# Patient Record
Sex: FEMALE | Race: WHITE | NOT HISPANIC OR LATINO | Employment: UNEMPLOYED | URBAN - METROPOLITAN AREA
[De-identification: names, ages, dates, MRNs, and addresses within clinical notes are randomized per-mention and may not be internally consistent; named-entity substitution may affect disease eponyms.]

---

## 2017-09-22 ENCOUNTER — TRANSCRIBE ORDERS (OUTPATIENT)
Dept: ADMINISTRATIVE | Facility: HOSPITAL | Age: 47
End: 2017-09-22

## 2017-09-22 DIAGNOSIS — Z12.31 VISIT FOR SCREENING MAMMOGRAM: Primary | ICD-10-CM

## 2017-10-19 ENCOUNTER — HOSPITAL ENCOUNTER (OUTPATIENT)
Dept: MAMMOGRAPHY | Facility: HOSPITAL | Age: 47
Discharge: HOME | End: 2017-10-19
Attending: OBSTETRICS & GYNECOLOGY | Admitting: OBSTETRICS & GYNECOLOGY

## 2017-10-19 DIAGNOSIS — Z12.31 VISIT FOR SCREENING MAMMOGRAM: ICD-10-CM

## 2017-10-19 PROCEDURE — 77063 BREAST TOMOSYNTHESIS BI: CPT | Performed by: RADIOLOGY

## 2017-10-19 PROCEDURE — 77063 BREAST TOMOSYNTHESIS BI: CPT

## 2017-10-19 PROCEDURE — 77067 SCR MAMMO BI INCL CAD: CPT | Performed by: RADIOLOGY

## 2017-10-19 PROCEDURE — G0202 SCR MAMMO BI INCL CAD: HCPCS

## 2018-09-24 ENCOUNTER — TRANSCRIBE ORDERS (OUTPATIENT)
Dept: ADMINISTRATIVE | Facility: HOSPITAL | Age: 48
End: 2018-09-24

## 2018-09-24 DIAGNOSIS — Z12.31 VISIT FOR SCREENING MAMMOGRAM: Primary | ICD-10-CM

## 2018-10-16 ENCOUNTER — HOSPITAL ENCOUNTER (OUTPATIENT)
Dept: MAMMOGRAPHY | Facility: HOSPITAL | Age: 48
Discharge: HOME | End: 2018-10-16
Attending: OBSTETRICS & GYNECOLOGY | Admitting: OBSTETRICS & GYNECOLOGY

## 2018-10-16 DIAGNOSIS — Z12.31 VISIT FOR SCREENING MAMMOGRAM: ICD-10-CM

## 2018-10-16 PROCEDURE — 77063 BREAST TOMOSYNTHESIS BI: CPT

## 2018-10-16 PROCEDURE — 77067 SCR MAMMO BI INCL CAD: CPT | Performed by: RADIOLOGY

## 2018-10-16 PROCEDURE — 77063 BREAST TOMOSYNTHESIS BI: CPT | Performed by: RADIOLOGY

## 2018-10-16 PROCEDURE — 77067 SCR MAMMO BI INCL CAD: CPT

## 2019-02-19 ENCOUNTER — OFFICE VISIT (OUTPATIENT)
Dept: BARIATRICS/WEIGHT MGMT | Facility: CLINIC | Age: 49
End: 2019-02-19

## 2019-02-19 ENCOUNTER — LAB (OUTPATIENT)
Dept: LAB | Facility: HOSPITAL | Age: 49
End: 2019-02-19

## 2019-02-19 VITALS
DIASTOLIC BLOOD PRESSURE: 94 MMHG | HEIGHT: 67 IN | SYSTOLIC BLOOD PRESSURE: 136 MMHG | WEIGHT: 287 LBS | BODY MASS INDEX: 45.04 KG/M2

## 2019-02-19 DIAGNOSIS — E66.01 OBESITY, CLASS III, BMI 40-49.9 (MORBID OBESITY) (HCC): ICD-10-CM

## 2019-02-19 DIAGNOSIS — E78.5 HYPERLIPIDEMIA, UNSPECIFIED HYPERLIPIDEMIA TYPE: ICD-10-CM

## 2019-02-19 DIAGNOSIS — I10 ESSENTIAL HYPERTENSION: Primary | ICD-10-CM

## 2019-02-19 PROBLEM — R53.82 CHRONIC FATIGUE: Status: ACTIVE | Noted: 2019-02-19

## 2019-02-19 LAB
25(OH)D3 SERPL-MCNC: 23.9 NG/ML
ALBUMIN SERPL-MCNC: 4.33 G/DL (ref 3.2–4.8)
ALBUMIN/GLOB SERPL: 1.7 G/DL (ref 1.5–2.5)
ALP SERPL-CCNC: 96 U/L (ref 25–100)
ALT SERPL W P-5'-P-CCNC: 24 U/L (ref 7–40)
ANION GAP SERPL CALCULATED.3IONS-SCNC: 6 MMOL/L (ref 3–11)
ARTICHOKE IGE QN: 167 MG/DL (ref 0–130)
AST SERPL-CCNC: 18 U/L (ref 0–33)
BASOPHILS # BLD AUTO: 0.05 10*3/MM3 (ref 0–0.2)
BASOPHILS NFR BLD AUTO: 0.5 % (ref 0–1)
BILIRUB SERPL-MCNC: 0.3 MG/DL (ref 0.3–1.2)
BUN BLD-MCNC: 17 MG/DL (ref 9–23)
BUN/CREAT SERPL: 25.8 (ref 7–25)
CALCIUM SPEC-SCNC: 9.5 MG/DL (ref 8.7–10.4)
CHLORIDE SERPL-SCNC: 105 MMOL/L (ref 99–109)
CHOLEST SERPL-MCNC: 237 MG/DL (ref 0–200)
CO2 SERPL-SCNC: 30 MMOL/L (ref 20–31)
CREAT BLD-MCNC: 0.66 MG/DL (ref 0.6–1.3)
DEPRECATED RDW RBC AUTO: 45.2 FL (ref 37–54)
EOSINOPHIL # BLD AUTO: 0.18 10*3/MM3 (ref 0–0.3)
EOSINOPHIL NFR BLD AUTO: 1.9 % (ref 0–3)
ERYTHROCYTE [DISTWIDTH] IN BLOOD BY AUTOMATED COUNT: 14.1 % (ref 11.3–14.5)
FOLATE SERPL-MCNC: 6.7 NG/ML (ref 3.2–20)
GFR SERPL CREATININE-BSD FRML MDRD: 96 ML/MIN/1.73
GLOBULIN UR ELPH-MCNC: 2.6 GM/DL
GLUCOSE BLD-MCNC: 95 MG/DL (ref 70–100)
HCT VFR BLD AUTO: 40.9 % (ref 34.5–44)
HDLC SERPL-MCNC: 50 MG/DL (ref 40–60)
HGB BLD-MCNC: 12.5 G/DL (ref 11.5–15.5)
IMM GRANULOCYTES # BLD AUTO: 0.03 10*3/MM3 (ref 0–0.05)
IMM GRANULOCYTES NFR BLD AUTO: 0.3 % (ref 0–0.6)
LYMPHOCYTES # BLD AUTO: 1.96 10*3/MM3 (ref 0.6–4.8)
LYMPHOCYTES NFR BLD AUTO: 20.9 % (ref 24–44)
MCH RBC QN AUTO: 27.1 PG (ref 27–31)
MCHC RBC AUTO-ENTMCNC: 30.6 G/DL (ref 32–36)
MCV RBC AUTO: 88.5 FL (ref 80–99)
MONOCYTES # BLD AUTO: 0.56 10*3/MM3 (ref 0–1)
MONOCYTES NFR BLD AUTO: 6 % (ref 0–12)
NEUTROPHILS # BLD AUTO: 6.61 10*3/MM3 (ref 1.5–8.3)
NEUTROPHILS NFR BLD AUTO: 70.7 % (ref 41–71)
PLATELET # BLD AUTO: 297 10*3/MM3 (ref 150–450)
PMV BLD AUTO: 10 FL (ref 6–12)
POTASSIUM BLD-SCNC: 4 MMOL/L (ref 3.5–5.5)
PROT SERPL-MCNC: 6.9 G/DL (ref 5.7–8.2)
RBC # BLD AUTO: 4.62 10*6/MM3 (ref 3.89–5.14)
SODIUM BLD-SCNC: 141 MMOL/L (ref 132–146)
T4 FREE SERPL-MCNC: 1.05 NG/DL (ref 0.89–1.76)
TRIGL SERPL-MCNC: 214 MG/DL (ref 0–150)
TSH SERPL DL<=0.05 MIU/L-ACNC: 1.13 MIU/ML (ref 0.35–5.35)
VIT B12 BLD-MCNC: 821 PG/ML (ref 211–911)
WBC NRBC COR # BLD: 9.36 10*3/MM3 (ref 3.5–10.8)

## 2019-02-19 PROCEDURE — 80053 COMPREHEN METABOLIC PANEL: CPT

## 2019-02-19 PROCEDURE — 83525 ASSAY OF INSULIN: CPT

## 2019-02-19 PROCEDURE — 36415 COLL VENOUS BLD VENIPUNCTURE: CPT

## 2019-02-19 PROCEDURE — 82306 VITAMIN D 25 HYDROXY: CPT

## 2019-02-19 PROCEDURE — 84439 ASSAY OF FREE THYROXINE: CPT

## 2019-02-19 PROCEDURE — MEDWTBODYCOMP: Performed by: NURSE PRACTITIONER

## 2019-02-19 PROCEDURE — 82607 VITAMIN B-12: CPT

## 2019-02-19 PROCEDURE — 84443 ASSAY THYROID STIM HORMONE: CPT

## 2019-02-19 PROCEDURE — 85025 COMPLETE CBC W/AUTO DIFF WBC: CPT

## 2019-02-19 PROCEDURE — MEDWTINJ PR MEDICAL WT LOSS INJECTION: Performed by: NURSE PRACTITIONER

## 2019-02-19 PROCEDURE — 84481 FREE ASSAY (FT-3): CPT

## 2019-02-19 PROCEDURE — 82746 ASSAY OF FOLIC ACID SERUM: CPT

## 2019-02-19 PROCEDURE — 80061 LIPID PANEL: CPT

## 2019-02-19 PROCEDURE — MEDWTESTPT: Performed by: NURSE PRACTITIONER

## 2019-02-19 RX ORDER — CETIRIZINE HYDROCHLORIDE 10 MG/1
1 TABLET ORAL DAILY
Refills: 2 | COMMUNITY
Start: 2019-02-04

## 2019-02-19 RX ORDER — IPRATROPIUM BROMIDE 42 UG/1
SPRAY, METERED NASAL
COMMUNITY
Start: 2019-02-08 | End: 2021-03-25

## 2019-02-19 RX ORDER — LISINOPRIL AND HYDROCHLOROTHIAZIDE 20; 12.5 MG/1; MG/1
1 TABLET ORAL DAILY
Refills: 1 | COMMUNITY
Start: 2019-01-06

## 2019-02-19 RX ORDER — OMEPRAZOLE 40 MG/1
CAPSULE, DELAYED RELEASE ORAL
Refills: 0 | COMMUNITY
Start: 2019-01-16 | End: 2021-03-25

## 2019-02-19 RX ORDER — MAGNESIUM GLUCONATE 30 MG(550)
595 TABLET ORAL DAILY
Qty: 90 TABLET | Refills: 2 | Status: SHIPPED | OUTPATIENT
Start: 2019-02-19 | End: 2019-07-17 | Stop reason: SDUPTHER

## 2019-02-19 RX ORDER — PANTOPRAZOLE SODIUM 40 MG/1
TABLET, DELAYED RELEASE ORAL DAILY
Refills: 2 | COMMUNITY
Start: 2018-11-30 | End: 2019-08-29 | Stop reason: ALTCHOICE

## 2019-02-19 RX ORDER — CHLORAL HYDRATE 500 MG
1000 CAPSULE ORAL 2 TIMES DAILY
Start: 2019-02-19

## 2019-02-19 RX ORDER — CHROMIUM 200 MCG
1 TABLET ORAL 2 TIMES DAILY WITH MEALS
Start: 2019-02-19

## 2019-02-19 RX ORDER — ACETAMINOPHEN AND CODEINE PHOSPHATE 120; 12 MG/5ML; MG/5ML
SOLUTION ORAL DAILY
Refills: 6 | COMMUNITY
Start: 2019-01-28 | End: 2021-03-01

## 2019-02-19 RX ORDER — TOPIRAMATE 25 MG/1
TABLET ORAL
Qty: 60 TABLET | Refills: 2 | Status: SHIPPED | OUTPATIENT
Start: 2019-02-19 | End: 2019-03-19 | Stop reason: SDUPTHER

## 2019-02-19 NOTE — PROGRESS NOTES
"[facility] [place] [facility]   [facility] [address]  [place] [address]    Name: Daisy Tierney  : 1970  Patient Care Team:  Jane Ponce MD as PCP - General (Internal Medicine)    Chief Complaint;:   Chief Complaint   Patient presents with   • Weight Loss        HPI   Daisy Tierney is a 48 y.o. female here to restart the weight loss program after an absence of 2 years.  This is her highest weight.  Patient is unsatisfied with weight loss progress and has concerns about weight loss. There were no unexpected side effects. of previously used medications. The patient is not taking the recommended multivitamin and fish oil. The patient is not using a food journal. The BMI is Body mass index is 44.95 kg/m²..  She tried weight watchers but did not have any results.  Has been battling allergies for the last 6 months, is finally improving.    The patient is exercising with a FITT score of:         Frequency Intensity Time Strength Training   [x]   0 [x]   0 [x]   0 [x]   0   []   1 (1-2x/week) []   1 (light) []   1 (<10 min) []   1 (1x/week)   []   2 (3-5x/week) []   2 (moderate) []   2 (10-20 min) []   2 (2x/week)   []   3 (fortune)   []   3 (moderately hard)  []   4 (very hard) []   3 (20-30 min)  []   4 (>30 min) []   3 (3-4x/week)             VS   Vitals:    19 1012   BP: 136/94   BP Location: Left arm   Patient Position: Sitting   Cuff Size: Large Adult   Weight: 130 kg (287 lb)   Height: 170.2 cm (67\")       Change in weight since last visit: 38lbs gained    Body composition analysis completed and showed:  %body fat: 47.8%  Total fat mass: 137lbs  Lean body mass: 150lbs    Measurements (in inches)  Neck: 16  Chest: 45.5  Waist: 46  Hips: 54  Thighs: 49    History    Past Medical History:   Diagnosis Date   • Allergic rhinitis    • GERD (gastroesophageal reflux disease)    • Hypertension        Patient Active Problem List   Diagnosis   • Chronic fatigue "       Allergies   Allergen Reactions   • Penicillins Itching         Current Outpatient Medications:   •  cetirizine (zyrTEC) 10 MG tablet, Take 1 tablet by mouth Daily., Disp: , Rfl: 2  •  ipratropium (ATROVENT) 0.06 % nasal spray, , Disp: , Rfl:   •  lisinopril-hydrochlorothiazide (PRINZIDE,ZESTORETIC) 20-12.5 MG per tablet, Take 1 tablet by mouth Daily., Disp: , Rfl: 1  •  norethindrone (MICRONOR) 0.35 MG tablet, Take  by mouth Daily., Disp: , Rfl: 6  •  omeprazole (priLOSEC) 40 MG capsule, TK 1 C PO D, Disp: , Rfl: 0  •  Chromium (CHROMIUM GTF) 200 MCG tablet, Take 1 tablet by mouth 2 (Two) Times a Day With Meals., Disp: , Rfl:   •  Multiple Vitamins-Minerals (MULTIVITAMIN ADULTS) tablet, Take 1 tablet by mouth Daily., Disp: , Rfl:   •  Omega-3 1000 MG capsule, Take 1,000 mg by mouth 2 (Two) Times a Day., Disp: , Rfl:   •  pantoprazole (PROTONIX) 40 MG EC tablet, Take  by mouth Daily., Disp: , Rfl: 2  •  potassium gluconate 595 (99 K) MG tablet tablet, Take 1 tablet by mouth Daily., Disp: 90 tablet, Rfl: 2  •  topiramate (TOPAMAX) 25 MG tablet, Take one daily at bedtime for a week then increase to 2 daily at bedtime, Disp: 60 tablet, Rfl: 2    Physical Exam:    General:  well developed; well nourished  no acute distress  obese    Lungs:  breathing is unlabored  clear to auscultation bilaterally   Heart:  regular rate and rhythm, S1, S2 normal, no murmur, click, rub or gallop       ASSESSMENT/PLAN:   Daisy was seen today for weight loss.    Diagnoses and all orders for this visit:    Essential hypertension    Obesity, Class III, BMI 40-49.9 (morbid obesity) (CMS/[facility])  -     topiramate (TOPAMAX) 25 MG tablet; Take one daily at bedtime for a week then increase to 2 daily at bedtime  -     Omega-3 1000 MG capsule; Take 1,000 mg by mouth 2 (Two) Times a Day.  -     Multiple Vitamins-Minerals (MULTIVITAMIN ADULTS) tablet; Take 1 tablet by mouth Daily.  -     Chromium (CHROMIUM GTF) 200 MCG tablet; Take 1 tablet by  mouth 2 (Two) Times a Day With Meals.  -     potassium gluconate 595 (99 K) MG tablet tablet; Take 1 tablet by mouth Daily.  -     CBC & Differential; Future  -     Comprehensive Metabolic Panel; Future  -     T3, Free; Future  -     Insulin, Total; Future  -     Lipid Panel; Future  -     TSH; Future  -     T4, free; Future  -     Vitamin D 25 Hydroxy; Future  -     Vitamin B12; Future  -     Folate; Future    Hyperlipidemia, unspecified hyperlipidemia type    BP elevated today, admits to being a little anxious. Will hold phentermine (tolerated well with program 2 years ago) until next visit and start with topamax. Monitor BP at home, should improve with weight loss and exercise. Add back potassium supplement.    Fatigue:  Should improve with weight loss. Will get labs today. Will try b-12 injection, discussed nutritional changes including decrease carbohydrates and increase water.     Restart program and meds. Being back nutritional focus and work on lifestyle behavioral changes. Recommend restarting food journal using new goal card for guidance. she will work towards initial goal of loss of  >10% of beginning body weight     I have instructed patient to restart the pursuit of medical weight loss as a part of this program. Patient does meet criteria for use of anorectics at this time as BMI >27, body fat percentage >30%, hyperlipidemia and hypertension. However, blood pressure is not acceptable to start today. Consider NV. The current plan for this month includes: patient will food journal, walk and hit their water goal at least 2 days per week. Meal replacement shake for lunch. Discussed calorie amnesia cure.      Plan of care reviewed with patient at the conclusion of today's visit. We discussed the risks, benefits, and limitations of treatments. Patient verbalizes understanding of and agreement with management plan.     Freddie report was pulled on patient, reviewed and found to be appropriate.       Return in  about 1 month (around 3/19/2019) for Recheck.      TANK Flowers

## 2019-02-20 LAB
INSULIN SERPL-ACNC: 24.9 UIU/ML (ref 2.6–24.9)
T3FREE SERPL-MCNC: 3.4 PG/ML (ref 2–4.4)

## 2019-02-21 ENCOUNTER — TELEPHONE (OUTPATIENT)
Dept: BARIATRICS/WEIGHT MGMT | Facility: CLINIC | Age: 49
End: 2019-02-21

## 2019-02-21 DIAGNOSIS — E55.9 VITAMIN D INSUFFICIENCY: ICD-10-CM

## 2019-02-21 DIAGNOSIS — E78.2 MIXED HYPERLIPIDEMIA: ICD-10-CM

## 2019-02-21 DIAGNOSIS — E88.81 INSULIN RESISTANCE: ICD-10-CM

## 2019-02-21 DIAGNOSIS — E66.01 OBESITY, CLASS III, BMI 40-49.9 (MORBID OBESITY) (HCC): ICD-10-CM

## 2019-02-21 DIAGNOSIS — E55.9 VITAMIN D INSUFFICIENCY: Primary | ICD-10-CM

## 2019-02-21 RX ORDER — DEXMETHYLPHENIDATE HYDROCHLORIDE 5 MG/1
5 TABLET ORAL 2 TIMES DAILY
COMMUNITY
End: 2019-08-29

## 2019-02-21 RX ORDER — ERGOCALCIFEROL 1.25 MG/1
50000 CAPSULE ORAL WEEKLY
Qty: 8 CAPSULE | Refills: 0 | Status: SHIPPED | OUTPATIENT
Start: 2019-02-21 | End: 2019-04-23

## 2019-02-21 NOTE — TELEPHONE ENCOUNTER
Called and talked with the pt. Pt verbalized understanding, and stated she would try to  the Vitamin D today.

## 2019-02-21 NOTE — TELEPHONE ENCOUNTER
----- Message from TANK Briseno sent at 2/21/2019  8:19 AM EST -----  Labs reviewed.  1.) Vit D kassieff: start protocol, will send order to pharmacy. Please call and let patient know.   2.) insulin and glucose showing evidence of insulin resistance. Previously on metformin. Will discuss more at next visit.  3.)  Mixed hyperlipidemia; triglyceride/HDL ratio is greater than 3.  Low-cholesterol protein choices and weight loss should help.  4.)  B12 and folate normal and ideal.  5.)  TSH and free T3 are normal and ideal.  Other labs are unremarkable. CG

## 2019-03-19 ENCOUNTER — OFFICE VISIT (OUTPATIENT)
Dept: BARIATRICS/WEIGHT MGMT | Facility: CLINIC | Age: 49
End: 2019-03-19

## 2019-03-19 VITALS
HEIGHT: 67 IN | WEIGHT: 282 LBS | DIASTOLIC BLOOD PRESSURE: 82 MMHG | SYSTOLIC BLOOD PRESSURE: 140 MMHG | HEART RATE: 88 BPM | BODY MASS INDEX: 44.26 KG/M2

## 2019-03-19 DIAGNOSIS — E66.01 OBESITY, CLASS III, BMI 40-49.9 (MORBID OBESITY) (HCC): ICD-10-CM

## 2019-03-19 DIAGNOSIS — R60.9 FLUID RETENTION: Primary | ICD-10-CM

## 2019-03-19 DIAGNOSIS — E55.9 VITAMIN D INSUFFICIENCY: ICD-10-CM

## 2019-03-19 DIAGNOSIS — E88.81 DYSMETABOLIC SYNDROME: ICD-10-CM

## 2019-03-19 PROBLEM — E88.810 DYSMETABOLIC SYNDROME: Status: ACTIVE | Noted: 2019-03-19

## 2019-03-19 PROCEDURE — MEDWTESTPT: Performed by: NURSE PRACTITIONER

## 2019-03-19 RX ORDER — TOPIRAMATE 25 MG/1
TABLET ORAL
Qty: 60 TABLET | Refills: 2 | Status: SHIPPED | OUTPATIENT
Start: 2019-03-19 | End: 2019-04-23 | Stop reason: SDUPTHER

## 2019-03-19 RX ORDER — METFORMIN HYDROCHLORIDE 500 MG/1
500 TABLET, EXTENDED RELEASE ORAL 2 TIMES DAILY WITH MEALS
Qty: 60 TABLET | Refills: 2 | Status: SHIPPED | OUTPATIENT
Start: 2019-03-19 | End: 2019-04-23 | Stop reason: SDUPTHER

## 2019-03-19 RX ORDER — SPIRONOLACTONE 25 MG/1
25 TABLET ORAL DAILY
Qty: 30 TABLET | Refills: 2 | Status: SHIPPED | OUTPATIENT
Start: 2019-03-19 | End: 2019-04-23 | Stop reason: SDUPTHER

## 2019-03-19 NOTE — PROGRESS NOTES
"[facility] [place] [facility]   [facility] [address]  [place] [address]    Name: Daisy Tierney  : 1970  Patient Care Team:  Jane Ponce MD as PCP - General (Internal Medicine)    Chief Complaint;:   Chief Complaint   Patient presents with   • Follow-up        HPI      Daisy Tierney is a 48 y.o. female here to follow up in active weight loss. Patient is satisfied with weight loss progress and has concerns about. There were no unexpected side effects.. There were no medication changes There were no changes in medical or surgical history The patient is taking the recommended multivitamin and fish oil. Hunger control has improved The patient is exercising. The patient is using a food journal. The BMI is Body mass index is 44.17 kg/m²..  Is feeling much better, has made tremendous improvements in changes.  Eating out less snacking less, overall making better food choices.    The patient is exercising with a FITT score of:    Frequency Intensity Time Strength Training   []   0 []   0 []   0 [x]   0   []   1 [x]   1 []   1 []   1   [x]   2 []   2 [x]   2 []   2   []   3  []   4 []   3  []   4 []   3  []   4 []   3  []   4     Change in weight since last visit: 5lbs lost    Current Weight: 282lbs .  Recent Weight History:    Wt Readings from Last 3 Encounters:   19 128 kg (282 lb)   19 130 kg (287 lb)     Start Weight: 287lbs  Total Loss/%Loss of BBW: 5lb     VS   Vitals:    19 0951   BP: 140/82   BP Location: Left arm   Patient Position: Sitting   Cuff Size: Large Adult   Pulse: 88   Weight: 128 kg (282 lb)   Height: 170.2 cm (67\")       Measurements (in inches)  Neck: 16  Chest: 47  Waist: 45  Hips: 55  Thighs: 49    Treatment Objectives  Weight Loss Goal: 5-10% loss of beginning body weight  Non-Weight Loss Goals: Improved blood pressure: has been addressed, diastolic significantly improved.    Past Medical History:   Diagnosis Date   • Allergic " rhinitis    • GERD (gastroesophageal reflux disease)    • Hypertension        Patient Active Problem List   Diagnosis   • Chronic fatigue   • Fluid retention   • Vitamin D insufficiency   • Dysmetabolic syndrome       Allergies   Allergen Reactions   • Penicillins Itching         Current Outpatient Medications:   •  cetirizine (zyrTEC) 10 MG tablet, Take 1 tablet by mouth Daily., Disp: , Rfl: 2  •  Chromium (CHROMIUM GTF) 200 MCG tablet, Take 1 tablet by mouth 2 (Two) Times a Day With Meals., Disp: , Rfl:   •  dexmethylphenidate (FOCALIN) 5 MG tablet, Take 5 mg by mouth 2 (Two) Times a Day., Disp: , Rfl:   •  ipratropium (ATROVENT) 0.06 % nasal spray, , Disp: , Rfl:   •  lisinopril-hydrochlorothiazide (PRINZIDE,ZESTORETIC) 20-12.5 MG per tablet, Take 1 tablet by mouth Daily., Disp: , Rfl: 1  •  Multiple Vitamins-Minerals (MULTIVITAMIN ADULTS) tablet, Take 1 tablet by mouth Daily., Disp: , Rfl:   •  norethindrone (MICRONOR) 0.35 MG tablet, Take  by mouth Daily., Disp: , Rfl: 6  •  Omega-3 1000 MG capsule, Take 1,000 mg by mouth 2 (Two) Times a Day., Disp: , Rfl:   •  omeprazole (priLOSEC) 40 MG capsule, TK 1 C PO D, Disp: , Rfl: 0  •  pantoprazole (PROTONIX) 40 MG EC tablet, Take  by mouth Daily., Disp: , Rfl: 2  •  potassium gluconate 595 (99 K) MG tablet tablet, Take 1 tablet by mouth Daily., Disp: 90 tablet, Rfl: 2  •  topiramate (TOPAMAX) 25 MG tablet, Take one daily at bedtime for a week then increase to 2 daily at bedtime, Disp: 60 tablet, Rfl: 2  •  vitamin D (ERGOCALCIFEROL) 63396 units capsule capsule, Take 1 capsule by mouth 1 (One) Time Per Week., Disp: 8 capsule, Rfl: 0  •  metFORMIN ER (GLUCOPHAGE-XR) 500 MG 24 hr tablet, Take 1 tablet by mouth 2 (Two) Times a Day With Meals. Take 1 daily with dinner or bedtime for one week, then increase to 2 daily., Disp: 60 tablet, Rfl: 2  •  spironolactone (ALDACTONE) 25 MG tablet, Take 1 tablet by mouth Daily., Disp: 30 tablet, Rfl: 2    Physical Exam:    General:   .well developed; well nourished  no acute distress  obese    Lungs:  breathing is unlabored  clear to auscultation bilaterally   Heart:  regular rate and rhythm, S1, S2 normal, no murmur, click, rub or gallop       ASSESSMENT/PLAN:   Daisy was seen today for follow-up.    Diagnoses and all orders for this visit:    Fluid retention  -     spironolactone (ALDACTONE) 25 MG tablet; Take 1 tablet by mouth Daily.    Obesity, Class III, BMI 40-49.9 (morbid obesity) (CMS/[facility])  -     topiramate (TOPAMAX) 25 MG tablet; Take one daily at bedtime for a week then increase to 2 daily at bedtime    Vitamin D insufficiency    Dysmetabolic syndrome  -     metFORMIN ER (GLUCOPHAGE-XR) 500 MG 24 hr tablet; Take 1 tablet by mouth 2 (Two) Times a Day With Meals. Take 1 daily with dinner or bedtime for one week, then increase to 2 daily.      I have instructed the patient to continue with pursuit of medical weight loss as a part of this program. Continue nutritional focus and work towards new exercise FITT goal of:         Frequency Intensity Time Strength Training   []   0 []   0 []   0 []   0   []   1 [x]   1 []   1 []   1   [x]   2 []   2 [x]   2 [x]   2   []   3   []   3  []   4 []   3  []   4 []   3       The current plan for this month includes: add resistance training, increase water to recommended daily amount, weight loss goal 4-6lbs this month, continue to work on lifestyle behavioral changes and continue nutrition focus. Keep up the excellent focus. Already feeling better, blood pressure improved. Add Spironolactone for fluid retention and increased carbohydrate cravings around menses.    Plan of care reviewed with the patient at the conclusion of today's visit. We discussed the risks, benefits, and limitations of treatments. Continue medications and OTC supplements as discussed. Patient verbalizes understanding of and agreement with management plan.      Return in about 1 month (around 4/19/2019) for Next scheduled follow  up.     Eva Jackson APRN

## 2019-04-23 ENCOUNTER — OFFICE VISIT (OUTPATIENT)
Dept: BARIATRICS/WEIGHT MGMT | Facility: CLINIC | Age: 49
End: 2019-04-23

## 2019-04-23 VITALS
HEART RATE: 84 BPM | BODY MASS INDEX: 44.57 KG/M2 | SYSTOLIC BLOOD PRESSURE: 120 MMHG | DIASTOLIC BLOOD PRESSURE: 70 MMHG | HEIGHT: 67 IN | WEIGHT: 284 LBS

## 2019-04-23 DIAGNOSIS — I10 ESSENTIAL HYPERTENSION: ICD-10-CM

## 2019-04-23 DIAGNOSIS — E66.01 OBESITY, CLASS III, BMI 40-49.9 (MORBID OBESITY) (HCC): Primary | ICD-10-CM

## 2019-04-23 DIAGNOSIS — R60.9 FLUID RETENTION: ICD-10-CM

## 2019-04-23 DIAGNOSIS — E88.81 DYSMETABOLIC SYNDROME: ICD-10-CM

## 2019-04-23 DIAGNOSIS — E55.9 VITAMIN D INSUFFICIENCY: ICD-10-CM

## 2019-04-23 PROCEDURE — MEDWTESTPT: Performed by: NURSE PRACTITIONER

## 2019-04-23 PROCEDURE — MEDWTINJ PR MEDICAL WT LOSS INJECTION: Performed by: NURSE PRACTITIONER

## 2019-04-23 RX ORDER — METFORMIN HYDROCHLORIDE 500 MG/1
500 TABLET, EXTENDED RELEASE ORAL 2 TIMES DAILY WITH MEALS
Qty: 60 TABLET | Refills: 2 | Status: SHIPPED | OUTPATIENT
Start: 2019-04-23 | End: 2019-05-23 | Stop reason: SDUPTHER

## 2019-04-23 RX ORDER — PHENTERMINE HYDROCHLORIDE 37.5 MG/1
TABLET ORAL
Qty: 28 TABLET | Refills: 0 | Status: SHIPPED | OUTPATIENT
Start: 2019-04-23 | End: 2019-05-23 | Stop reason: SDUPTHER

## 2019-04-23 RX ORDER — CYANOCOBALAMIN 1000 UG/ML
1000 INJECTION, SOLUTION INTRAMUSCULAR; SUBCUTANEOUS ONCE
Status: COMPLETED | OUTPATIENT
Start: 2019-04-23 | End: 2019-04-23

## 2019-04-23 RX ORDER — TOPIRAMATE 25 MG/1
TABLET ORAL
Qty: 60 TABLET | Refills: 2 | Status: SHIPPED | OUTPATIENT
Start: 2019-04-23 | End: 2019-05-23 | Stop reason: SDUPTHER

## 2019-04-23 RX ORDER — SPIRONOLACTONE 25 MG/1
25 TABLET ORAL DAILY
Qty: 30 TABLET | Refills: 2 | Status: SHIPPED | OUTPATIENT
Start: 2019-04-23 | End: 2020-02-11 | Stop reason: SDUPTHER

## 2019-04-23 RX ADMIN — CYANOCOBALAMIN 1000 MCG: 1000 INJECTION, SOLUTION INTRAMUSCULAR; SUBCUTANEOUS at 10:04

## 2019-04-23 NOTE — PROGRESS NOTES
"[facility] [place] [facility]   [facility] [address]  [place] [address]    Name: Daisy Tierney  : 1970  Patient Care Team:  Jane Ponce MD as PCP - General (Internal Medicine)    Chief Complaint;:   Chief Complaint   Patient presents with   • Follow-up        HPI      Daisy Tierney is a 48 y.o. female here to follow up in active weight loss. Patient is satisfied with weight loss progress and has no questions or concerns today.. There were no unexpected side effects.. There were no medication changes There were no changes in medical or surgical history The patient is taking the recommended multivitamin and fish oil. Hunger control has remain unchanged The patient is exercising. The patient is using a food journal. The BMI is Body mass index is 44.48 kg/m²..     The patient is exercising with a FITT score of:    Frequency Intensity Time Strength Training   []   0 []   0 []   0 []   0   []   1 [x]   1 []   1 []   1   [x]   2 []   2 [x]   2 [x]   2   []   3  []   4 []   3  []   4 []   3  []   4 []   3  []   4     Change in weight since last visit: 2lb    Current Weight:284lb  Recent Weight History:   Wt Readings from Last 3 Encounters:   19 129 kg (284 lb)   19 128 kg (282 lb)   19 130 kg (287 lb)     Start Weight: 287lb  Total Loss/%Loss of BBW: -1.05%    VS   Vitals:    19 0931   BP: 120/70   BP Location: Left arm   Patient Position: Sitting   Cuff Size: Adult   Pulse: 84   Weight: 129 kg (284 lb)   Height: 170.2 cm (67\")       Measurements (in inches)  Neck: 16.5  Chest: 46.5  Waist: 46.5  Hips: 55.5  Thighs: 50    Treatment Objectives   Weight Loss Goal: 5-10% loss of beginning body weight   Non-Weight Loss Goals: Improved blood glucose: has been addressed. and Improved blood pressure: has been addressed.    Past Medical History:   Diagnosis Date   • Allergic rhinitis    • GERD (gastroesophageal reflux disease)    • Hypertension  "       Patient Active Problem List   Diagnosis   • Chronic fatigue   • Fluid retention   • Vitamin D insufficiency   • Dysmetabolic syndrome       Allergies   Allergen Reactions   • Penicillins Itching         Current Outpatient Medications:   •  cetirizine (zyrTEC) 10 MG tablet, Take 1 tablet by mouth Daily., Disp: , Rfl: 2  •  Chromium (CHROMIUM GTF) 200 MCG tablet, Take 1 tablet by mouth 2 (Two) Times a Day With Meals., Disp: , Rfl:   •  dexmethylphenidate (FOCALIN) 5 MG tablet, Take 5 mg by mouth 2 (Two) Times a Day., Disp: , Rfl:   •  ipratropium (ATROVENT) 0.06 % nasal spray, , Disp: , Rfl:   •  lisinopril-hydrochlorothiazide (PRINZIDE,ZESTORETIC) 20-12.5 MG per tablet, Take 1 tablet by mouth Daily., Disp: , Rfl: 1  •  metFORMIN ER (GLUCOPHAGE-XR) 500 MG 24 hr tablet, Take 1 tablet by mouth 2 (Two) Times a Day With Meals. Take 1 daily with dinner or bedtime for one week, then increase to 2 daily., Disp: 60 tablet, Rfl: 2  •  Multiple Vitamins-Minerals (MULTIVITAMIN ADULTS) tablet, Take 1 tablet by mouth Daily., Disp: , Rfl:   •  norethindrone (MICRONOR) 0.35 MG tablet, Take  by mouth Daily., Disp: , Rfl: 6  •  Omega-3 1000 MG capsule, Take 1,000 mg by mouth 2 (Two) Times a Day., Disp: , Rfl:   •  omeprazole (priLOSEC) 40 MG capsule, TK 1 C PO D, Disp: , Rfl: 0  •  pantoprazole (PROTONIX) 40 MG EC tablet, Take  by mouth Daily., Disp: , Rfl: 2  •  potassium gluconate 595 (99 K) MG tablet tablet, Take 1 tablet by mouth Daily., Disp: 90 tablet, Rfl: 2  •  spironolactone (ALDACTONE) 25 MG tablet, Take 1 tablet by mouth Daily., Disp: 30 tablet, Rfl: 2  •  topiramate (TOPAMAX) 25 MG tablet, Take one daily at bedtime for a week then increase to 2 daily at bedtime, Disp: 60 tablet, Rfl: 2  •  Cholecalciferol (VITAMIN D-3) 5000 units tablet, Take 1 tablet by mouth Daily., Disp: 120 tablet, Rfl: 3  •  phentermine (ADIPEX-P) 37.5 MG tablet, Take 1/2 tablet at 11am for 3 days, If it goes over 150/90 call provider, Disp: 28  tablet, Rfl: 0    Physical Exam:    General:  .well developed; well nourished  no acute distress  obese    Lungs:  breathing is unlabored  clear to auscultation bilaterally   Heart:  regular rate and rhythm, S1, S2 normal, no murmur, click, rub or gallop       ASSESSMENT/PLAN:   Daisy was seen today for follow-up.    Diagnoses and all orders for this visit:    Obesity, Class III, BMI 40-49.9 (morbid obesity) (CMS/[facility])  -     topiramate (TOPAMAX) 25 MG tablet; Take one daily at bedtime for a week then increase to 2 daily at bedtime  -     phentermine (ADIPEX-P) 37.5 MG tablet; Take 1/2 tablet at 11am for 3 days, If it goes over 150/90 call provider    Dysmetabolic syndrome  -     metFORMIN ER (GLUCOPHAGE-XR) 500 MG 24 hr tablet; Take 1 tablet by mouth 2 (Two) Times a Day With Meals. Take 1 daily with dinner or bedtime for one week, then increase to 2 daily.  -     cyanocobalamin injection 1,000 mcg    Fluid retention  -     spironolactone (ALDACTONE) 25 MG tablet; Take 1 tablet by mouth Daily.    Essential hypertension  -     phentermine (ADIPEX-P) 37.5 MG tablet; Take 1/2 tablet at 11am for 3 days, If it goes over 150/90 call provider    Vitamin D insufficiency  -     Cholecalciferol (VITAMIN D-3) 5000 units tablet; Take 1 tablet by mouth Daily.        I have instructed the patient to continue with pursuit of medical weight loss as a part of this program. Continue nutritional focus and work towards new exercise FITT goal of:         Frequency Intensity Time Strength Training   []   0 []   0 []   0 []   0   []   1 [x]   1 []   1 []   1   [x]   2 []   2 [x]   2 [x]   2   []   3   []   3  []   4 []   3  []   4 []   3       The current plan for this month includes: continue current exercise efforts, it is appropriate to continue anorectic medications as prescribed at this time and continue to work on lifestyle behavioral changes. Continue to work on water goal. Will add 37 today due to less weight loss than expected,  has taken previously and tolerated well.      Plan of care reviewed with the patient at the conclusion of today's visit. We discussed the risks, benefits, and limitations of treatments. Continue medications and OTC supplements as discussed. Patient verbalizes understanding of and agreement with management plan.      Return in about 1 month (around 5/23/2019) for Next scheduled follow up.     TANK Flowers

## 2019-05-23 ENCOUNTER — OFFICE VISIT (OUTPATIENT)
Dept: BARIATRICS/WEIGHT MGMT | Facility: CLINIC | Age: 49
End: 2019-05-23

## 2019-05-23 VITALS
DIASTOLIC BLOOD PRESSURE: 88 MMHG | WEIGHT: 272 LBS | SYSTOLIC BLOOD PRESSURE: 124 MMHG | BODY MASS INDEX: 42.6 KG/M2 | HEART RATE: 96 BPM

## 2019-05-23 DIAGNOSIS — I10 ESSENTIAL HYPERTENSION: ICD-10-CM

## 2019-05-23 DIAGNOSIS — E88.81 DYSMETABOLIC SYNDROME: ICD-10-CM

## 2019-05-23 DIAGNOSIS — R53.83 OTHER FATIGUE: Primary | ICD-10-CM

## 2019-05-23 DIAGNOSIS — E66.01 OBESITY, CLASS III, BMI 40-49.9 (MORBID OBESITY) (HCC): ICD-10-CM

## 2019-05-23 PROCEDURE — MEDWTINJ PR MEDICAL WT LOSS INJECTION: Performed by: NURSE PRACTITIONER

## 2019-05-23 PROCEDURE — MEDWTESTPT: Performed by: NURSE PRACTITIONER

## 2019-05-23 RX ORDER — PHENTERMINE HYDROCHLORIDE 37.5 MG/1
TABLET ORAL
Qty: 28 TABLET | Refills: 0 | Status: SHIPPED | OUTPATIENT
Start: 2019-05-23 | End: 2019-07-17 | Stop reason: SDUPTHER

## 2019-05-23 RX ORDER — TOPIRAMATE 25 MG/1
TABLET ORAL
Qty: 60 TABLET | Refills: 2 | Status: SHIPPED | OUTPATIENT
Start: 2019-05-23 | End: 2019-07-17 | Stop reason: SDUPTHER

## 2019-05-23 RX ORDER — CYANOCOBALAMIN 1000 UG/ML
1000 INJECTION, SOLUTION INTRAMUSCULAR; SUBCUTANEOUS ONCE
Status: COMPLETED | OUTPATIENT
Start: 2019-05-23 | End: 2019-05-23

## 2019-05-23 RX ORDER — METFORMIN HYDROCHLORIDE 500 MG/1
500 TABLET, EXTENDED RELEASE ORAL 2 TIMES DAILY WITH MEALS
Qty: 60 TABLET | Refills: 2 | Status: SHIPPED | OUTPATIENT
Start: 2019-05-23 | End: 2019-10-01 | Stop reason: SDUPTHER

## 2019-05-23 RX ADMIN — CYANOCOBALAMIN 1000 MCG: 1000 INJECTION, SOLUTION INTRAMUSCULAR; SUBCUTANEOUS at 08:45

## 2019-05-23 NOTE — PROGRESS NOTES
[facility]   [facility] [address]  [place] [address]    Name: Daisy Tierney  : 1970  Patient Care Team:  Jane Ponce MD as PCP - General (Internal Medicine)    Chief Complaint;: No chief complaint on file.       HPI      Daisy Tierney is a 48 y.o. female here to follow up in active weight loss. Patient is satisfied with weight loss progress and has no questions or concerns today.. There were no unexpected side effects.. There were no medication changes There were no changes in medical or surgical history The patient is taking the recommended multivitamin and fish oil. Hunger control has improved The patient is exercising. The patient is using a food journal. The BMI is Body mass index is 42.6 kg/m².. Reflux better with wt loss.   The patient is exercising with a FITT score of:    Frequency Intensity Time Strength Training   []   0 []   0 []   0 []   0   []   1 []   1 []   1 []   1   []   2 [x]   2 []   2 [x]   2   [x]   3  []   4 []   3  []   4 [x]   3  []   4 []   3  []   4     Change in weight since last visit: 12lb    Current Weight: 272.  Recent Weight History:   Wt Readings from Last 3 Encounters:   19 123 kg (272 lb)   19 129 kg (284 lb)   19 128 kg (282 lb)     Start Weight: 287lb  Total Loss/%Loss of BBW: 15lb/5.23%  VS   Vitals:    19 0830   BP: 124/88   BP Location: Left arm   Cuff Size: Large Adult   Pulse: 96   Weight: 123 kg (272 lb)       Measurements (in inches)  Waist: 46    Treatment Objectives   Weight Loss Goal: 15-20% loss of beginning body weight   Non-Weight Loss Goals: Improved blood pressure: has been addressed., Improved cholesterol: has been addressed. and Improved reflux: has been addressed.    Past Medical History:   Diagnosis Date   • Allergic rhinitis    • GERD (gastroesophageal reflux disease)    • Hypertension        Patient Active Problem List   Diagnosis   • Chronic fatigue   • Fluid  retention   • Vitamin D insufficiency   • Dysmetabolic syndrome       Allergies   Allergen Reactions   • Penicillins Itching         Current Outpatient Medications:   •  cetirizine (zyrTEC) 10 MG tablet, Take 1 tablet by mouth Daily., Disp: , Rfl: 2  •  Cholecalciferol (VITAMIN D-3) 5000 units tablet, Take 1 tablet by mouth Daily., Disp: 120 tablet, Rfl: 3  •  Chromium (CHROMIUM GTF) 200 MCG tablet, Take 1 tablet by mouth 2 (Two) Times a Day With Meals., Disp: , Rfl:   •  dexmethylphenidate (FOCALIN) 5 MG tablet, Take 5 mg by mouth 2 (Two) Times a Day., Disp: , Rfl:   •  ipratropium (ATROVENT) 0.06 % nasal spray, , Disp: , Rfl:   •  lisinopril-hydrochlorothiazide (PRINZIDE,ZESTORETIC) 20-12.5 MG per tablet, Take 1 tablet by mouth Daily., Disp: , Rfl: 1  •  metFORMIN ER (GLUCOPHAGE-XR) 500 MG 24 hr tablet, Take 1 tablet by mouth 2 (Two) Times a Day With Meals. Take 1 daily with dinner or bedtime for one week, then increase to 2 daily., Disp: 60 tablet, Rfl: 2  •  Multiple Vitamins-Minerals (MULTIVITAMIN ADULTS) tablet, Take 1 tablet by mouth Daily., Disp: , Rfl:   •  norethindrone (MICRONOR) 0.35 MG tablet, Take  by mouth Daily., Disp: , Rfl: 6  •  Omega-3 1000 MG capsule, Take 1,000 mg by mouth 2 (Two) Times a Day., Disp: , Rfl:   •  omeprazole (priLOSEC) 40 MG capsule, TK 1 C PO D, Disp: , Rfl: 0  •  pantoprazole (PROTONIX) 40 MG EC tablet, Take  by mouth Daily., Disp: , Rfl: 2  •  phentermine (ADIPEX-P) 37.5 MG tablet, Take 1/2 tablet at 11am for 3 days, If it goes over 150/90 call provider, Disp: 28 tablet, Rfl: 0  •  potassium gluconate 595 (99 K) MG tablet tablet, Take 1 tablet by mouth Daily., Disp: 90 tablet, Rfl: 2  •  spironolactone (ALDACTONE) 25 MG tablet, Take 1 tablet by mouth Daily., Disp: 30 tablet, Rfl: 2  •  topiramate (TOPAMAX) 25 MG tablet, Take one daily at bedtime for a week then increase to 2 daily at bedtime, Disp: 60 tablet, Rfl: 2    Current Facility-Administered Medications:   •   cyanocobalamin injection 1,000 mcg, 1,000 mcg, Intramuscular, Once, ManuelEva, APRN    Physical Exam:    General:  .well developed; well nourished  no acute distress  obese    Lungs:  breathing is unlabored  clear to auscultation bilaterally   Heart:  regular rate and rhythm, S1, S2 normal, no murmur, click, rub or gallop       ASSESSMENT/PLAN:   Diagnoses and all orders for this visit:    Other fatigue  -     cyanocobalamin injection 1,000 mcg    Obesity, Class III, BMI 40-49.9 (morbid obesity) (CMS/[facility])  -     cyanocobalamin injection 1,000 mcg  -     phentermine (ADIPEX-P) 37.5 MG tablet; Take 1/2 tablet at 11am for 3 days, If it goes over 150/90 call provider  -     topiramate (TOPAMAX) 25 MG tablet; Take one daily at bedtime for a week then increase to 2 daily at bedtime    Essential hypertension  -     phentermine (ADIPEX-P) 37.5 MG tablet; Take 1/2 tablet at 11am for 3 days, If it goes over 150/90 call provider    Dysmetabolic syndrome  -     metFORMIN ER (GLUCOPHAGE-XR) 500 MG 24 hr tablet; Take 1 tablet by mouth 2 (Two) Times a Day With Meals. Take 1 daily with dinner or bedtime for one week, then increase to 2 daily.    I have instructed the patient to continue with pursuit of medical weight loss as a part of this program. Continue nutritional focus and work towards new exercise FITT goal of:         Frequency Intensity Time Strength Training   []   0 []   0 []   0 []   0   []   1 []   1 []   1 []   1   [x]   2 [x]   2 []   2 [x]   2   []   3   []   3  []   4 [x]   3  []   4 []   3       The current plan for this month includes: Keep up the excellent focus. Keep up great exercise, water, and FJ.     Plan of care reviewed with the patient at the conclusion of today's visit. We discussed the risks, benefits, and limitations of treatments. Continue medications and OTC supplements as discussed. Patient verbalizes understanding of and agreement with management plan.      Return in about 1 month (around  6/23/2019) for Next scheduled follow up.     Eva Jackson APRN

## 2019-07-17 ENCOUNTER — TELEPHONE (OUTPATIENT)
Dept: BARIATRICS/WEIGHT MGMT | Facility: CLINIC | Age: 49
End: 2019-07-17

## 2019-07-17 ENCOUNTER — OFFICE VISIT (OUTPATIENT)
Dept: BARIATRICS/WEIGHT MGMT | Facility: CLINIC | Age: 49
End: 2019-07-17

## 2019-07-17 VITALS
WEIGHT: 268 LBS | DIASTOLIC BLOOD PRESSURE: 72 MMHG | HEART RATE: 88 BPM | SYSTOLIC BLOOD PRESSURE: 120 MMHG | HEIGHT: 67 IN | BODY MASS INDEX: 42.06 KG/M2

## 2019-07-17 DIAGNOSIS — E66.01 OBESITY, CLASS III, BMI 40-49.9 (MORBID OBESITY) (HCC): ICD-10-CM

## 2019-07-17 DIAGNOSIS — I10 ESSENTIAL HYPERTENSION: ICD-10-CM

## 2019-07-17 PROCEDURE — MEDWTESTPT: Performed by: NURSE PRACTITIONER

## 2019-07-17 RX ORDER — PHENTERMINE HYDROCHLORIDE 37.5 MG/1
TABLET ORAL
Qty: 30 TABLET | Refills: 0 | Status: SHIPPED | OUTPATIENT
Start: 2019-07-17 | End: 2019-08-29 | Stop reason: DRUGHIGH

## 2019-07-17 RX ORDER — MAGNESIUM GLUCONATE 30 MG(550)
595 TABLET ORAL DAILY
Qty: 90 TABLET | Refills: 2 | Status: SHIPPED | OUTPATIENT
Start: 2019-07-17 | End: 2022-06-02

## 2019-07-17 RX ORDER — TOPIRAMATE 25 MG/1
TABLET ORAL
Qty: 60 TABLET | Refills: 2 | Status: SHIPPED | OUTPATIENT
Start: 2019-07-17 | End: 2019-10-01 | Stop reason: SDUPTHER

## 2019-07-17 RX ORDER — PHENTERMINE HYDROCHLORIDE 37.5 MG/1
TABLET ORAL
Qty: 28 TABLET | Refills: 0 | Status: SHIPPED | OUTPATIENT
Start: 2019-07-17 | End: 2019-07-17

## 2019-07-17 NOTE — PROGRESS NOTES
"[facility] [place] [facility]   [facility] [address]  [place] [address]    Name: Daisy Tierney  : 1970  Patient Care Team:  Jane Ponce MD as PCP - General (Internal Medicine)    Chief Complaint;:   Chief Complaint   Patient presents with   • Follow-up        HPI      Daisy Tierney is a 48 y.o. female here to follow up in active weight loss. Patient is satisfied with weight loss progress and has no questions or concerns today.. There were no unexpected side effects.. There were no medication changes There were no changes in medical or surgical history The patient is taking the recommended multivitamin and fish oil. Hunger control has improved The patient is exercising. The patient is using a food journal. The BMI is Body mass index is 41.97 kg/m². Went on two vacations, is back on track with routine.     The patient is exercising with a FITT score of:    Frequency   Intensity Time Strength Training   []   0 None  []   0 None  []   0 None  []   0 None    []   1 (1-2x/week) [x]   1 (light) []   1 (<10 min) []   1 (1x/week)   [x]   2 (3-5x/week) []   2 (moderate) []   2 (10-20 min) [x]   2 (2x/week)   []   3 (daily)   []   3 (moderately hard)  []   4 (very hard) [x]   3 (20-30 min)  []   4 (>30 min) []   3 (3-4x/week)       Change in weight since last visit: 4lbs lost    Current Weight: 268lbs .  Recent Weight History:  Wt Readings from Last 3 Encounters:   19 122 kg (268 lb)   19 123 kg (272 lb)   19 129 kg (284 lb)     Start Weight: 287lbs  Total Loss/%Loss of BBW: -6.62%    VS   Vitals:    19 1027   BP: 120/72   BP Location: Left arm   Patient Position: Sitting   Cuff Size: Large Adult   Pulse: 88   Weight: 122 kg (268 lb)   Height: 170.2 cm (67\")       Measurements (in inches)  Neck: 15.5  Chest: 44.5  Waist: 44  Hips: 51.5  Thighs: 47    Treatment Objectives   Weight Loss Goal: 5-10% loss of beginning body weight   Non-Weight Loss " Goals: Improved blood glucose: has been addressed., Improved blood pressure: has been addressed. and Improved cholesterol: has been addressed.    Past Medical History:   Diagnosis Date   • Allergic rhinitis    • GERD (gastroesophageal reflux disease)    • Hypertension        Patient Active Problem List   Diagnosis   • Chronic fatigue   • Fluid retention   • Vitamin D insufficiency   • Dysmetabolic syndrome       Allergies   Allergen Reactions   • Penicillins Itching         Current Outpatient Medications:   •  cetirizine (zyrTEC) 10 MG tablet, Take 1 tablet by mouth Daily., Disp: , Rfl: 2  •  Cholecalciferol (VITAMIN D-3) 5000 units tablet, Take 1 tablet by mouth Daily., Disp: 120 tablet, Rfl: 3  •  Chromium (CHROMIUM GTF) 200 MCG tablet, Take 1 tablet by mouth 2 (Two) Times a Day With Meals., Disp: , Rfl:   •  dexmethylphenidate (FOCALIN) 5 MG tablet, Take 5 mg by mouth 2 (Two) Times a Day., Disp: , Rfl:   •  ipratropium (ATROVENT) 0.06 % nasal spray, , Disp: , Rfl:   •  lisinopril-hydrochlorothiazide (PRINZIDE,ZESTORETIC) 20-12.5 MG per tablet, Take 1 tablet by mouth Daily., Disp: , Rfl: 1  •  metFORMIN ER (GLUCOPHAGE-XR) 500 MG 24 hr tablet, Take 1 tablet by mouth 2 (Two) Times a Day With Meals. Take 1 daily with dinner or bedtime for one week, then increase to 2 daily., Disp: 60 tablet, Rfl: 2  •  Multiple Vitamins-Minerals (MULTIVITAMIN ADULTS) tablet, Take 1 tablet by mouth Daily., Disp: , Rfl:   •  norethindrone (MICRONOR) 0.35 MG tablet, Take  by mouth Daily., Disp: , Rfl: 6  •  Omega-3 1000 MG capsule, Take 1,000 mg by mouth 2 (Two) Times a Day., Disp: , Rfl:   •  omeprazole (priLOSEC) 40 MG capsule, TK 1 C PO D, Disp: , Rfl: 0  •  pantoprazole (PROTONIX) 40 MG EC tablet, Take  by mouth Daily., Disp: , Rfl: 2  •  phentermine (ADIPEX-P) 37.5 MG tablet, Take 1/2 tablet at 11am for 3 days, If it goes over 150/90 call provider, Disp: 28 tablet, Rfl: 0  •  potassium gluconate 595 (99 K) MG tablet tablet, Take 1  tablet by mouth Daily., Disp: 90 tablet, Rfl: 2  •  spironolactone (ALDACTONE) 25 MG tablet, Take 1 tablet by mouth Daily., Disp: 30 tablet, Rfl: 2  •  topiramate (TOPAMAX) 25 MG tablet, Take one daily at bedtime for a week then increase to 2 daily at bedtime, Disp: 60 tablet, Rfl: 2    Physical Exam:    General:  .well developed; well nourished  no acute distress  obese    Lungs:  breathing is unlabored  clear to auscultation bilaterally   Heart:  regular rate and rhythm, S1, S2 normal, no murmur, click, rub or gallop       ASSESSMENT/PLAN:   Daisy was seen today for follow-up.    Diagnoses and all orders for this visit:    Obesity, Class III, BMI 40-49.9 (morbid obesity) (CMS/[facility])    Essential hypertension    I have instructed the patient to continue with pursuit of medical weight loss as a part of this program. Continue nutritional focus and work towards new exercise FITT goal of:     Frequency   Intensity Time Strength Training   []   0 None  []   0 None  []   0 None  []   0 None    []   1 (1-2x/week) []   1 (light) []   1 (<10 min) []   1 (1x/week)   [x]   2 (3-5x/week) [x]   2 (moderate) []   2 (10-20 min) []   2 (2x/week)   []   3 (daily)   []   3 (moderately hard)  []   4 (very hard) [x]   3 (20-30 min)  []   4 (>30 min) [x]   3 (3-4x/week)       The current plan for this month includes: Keep up the excellent daily exercise, continue with water goal (100oz/day), preplanning with meal planning.     Plan of care reviewed with the patient at the conclusion of today's visit. We discussed the risks, benefits, and limitations of treatments. Continue medications and OTC supplements as discussed. Patient verbalizes understanding of and agreement with management plan.      Return in about 1 month (around 8/17/2019) for Next scheduled follow up.     TANK Flowers

## 2019-08-29 ENCOUNTER — OFFICE VISIT (OUTPATIENT)
Dept: BARIATRICS/WEIGHT MGMT | Facility: CLINIC | Age: 49
End: 2019-08-29

## 2019-08-29 VITALS
SYSTOLIC BLOOD PRESSURE: 130 MMHG | HEART RATE: 56 BPM | WEIGHT: 269 LBS | BODY MASS INDEX: 42.22 KG/M2 | DIASTOLIC BLOOD PRESSURE: 80 MMHG | HEIGHT: 67 IN

## 2019-08-29 DIAGNOSIS — E88.81 DYSMETABOLIC SYNDROME: ICD-10-CM

## 2019-08-29 DIAGNOSIS — E66.01 OBESITY, CLASS III, BMI 40-49.9 (MORBID OBESITY) (HCC): ICD-10-CM

## 2019-08-29 DIAGNOSIS — R53.83 OTHER FATIGUE: Primary | ICD-10-CM

## 2019-08-29 PROCEDURE — MEDWTESTPT: Performed by: NURSE PRACTITIONER

## 2019-08-29 RX ORDER — PHENDIMETRAZINE TARTRATE 35 MG/1
TABLET ORAL
Qty: 28 EACH | Refills: 0 | Status: SHIPPED | OUTPATIENT
Start: 2019-08-29 | End: 2019-10-01 | Stop reason: SDUPTHER

## 2019-08-29 RX ORDER — PHENTERMINE HYDROCHLORIDE 37.5 MG/1
TABLET ORAL
Qty: 30 TABLET | Refills: 0 | Status: SHIPPED | OUTPATIENT
Start: 2019-08-29 | End: 2019-10-01 | Stop reason: SDUPTHER

## 2019-08-29 RX ORDER — CYANOCOBALAMIN 1000 UG/ML
1000 INJECTION, SOLUTION INTRAMUSCULAR; SUBCUTANEOUS ONCE
Status: DISCONTINUED | OUTPATIENT
Start: 2019-08-29 | End: 2019-08-29

## 2019-08-29 RX ORDER — CYANOCOBALAMIN 1000 UG/ML
1000 INJECTION, SOLUTION INTRAMUSCULAR; SUBCUTANEOUS ONCE
Status: COMPLETED | OUTPATIENT
Start: 2019-08-29 | End: 2019-08-29

## 2019-08-29 RX ADMIN — CYANOCOBALAMIN 1000 MCG: 1000 INJECTION, SOLUTION INTRAMUSCULAR; SUBCUTANEOUS at 13:02

## 2019-08-29 NOTE — PROGRESS NOTES
"[facility] [place] [facility]   [facility] [address]  [place] [address]    Name: Daisy Tierney  : 1970  Patient Care Team:  Jane Ponce MD as PCP - General (Internal Medicine)    Chief Complaint;:   Chief Complaint   Patient presents with   • Weight Loss   • Nutrition Counseling        HPI      Daisy Tierney is a 48 y.o. female here to follow up in active weight loss. Patient is unsatisfied with weight loss progress and has concerns about hitting a plateau, and not losing more weight . There were no unexpected side effects.. There were no medication changes There were no changes in medical or surgical history The patient is taking the recommended multivitamin and fish oil. Hunger control has worsened  The patient is exercising. The patient is using a food journal. The BMI is Body mass index is 42.13 kg/m²..     The patient is exercising with a FITT score of:    Frequency   Intensity Time Strength Training   []   0 None  []   0 None  []   0 None  []   0 None    [x]   1 (1-2x/week) [x]   1 (light) []   1 (<10 min) [x]   1 (1x/week)   []   2 (3-5x/week) []   2 (moderate) []   2 (10-20 min) []   2 (2x/week)   []   3 (daily)   []   3 (moderately hard)  []   4 (very hard) [x]   3 (20-30 min)  []   4 (>30 min) []   3 (3-4x/week)       Change in weight since last visit: 1lb gained    Current Weight: 269lbs .  Recent Weight History:    Wt Readings from Last 3 Encounters:   19 122 kg (269 lb)   19 122 kg (268 lb)   19 123 kg (272 lb)     Start Weight: 287lbs  Total Loss/%Loss of BBW: -6.27%  VS   Vitals:    19 1250   BP: 130/80   BP Location: Right arm   Patient Position: Sitting   Cuff Size: Large Adult   Pulse: 56   Weight: 122 kg (269 lb)   Height: 170.2 cm (67\")       Measurements (in inches)  Neck: 15  Chest: 45  Waist: 44  Hips: 52  Thighs: 47.5    Treatment Objectives   Weight Loss Goal: 5-10% loss of beginning body weight   Non-Weight Loss " Goals: Improved blood pressure: has been addressed. and Improved reflux: has been addressed.    Past Medical History:   Diagnosis Date   • Allergic rhinitis    • GERD (gastroesophageal reflux disease)    • Hypertension        Patient Active Problem List   Diagnosis   • Chronic fatigue   • Fluid retention   • Vitamin D insufficiency   • Dysmetabolic syndrome       Allergies   Allergen Reactions   • Penicillins Itching         Current Outpatient Medications:   •  cetirizine (zyrTEC) 10 MG tablet, Take 1 tablet by mouth Daily., Disp: , Rfl: 2  •  Cholecalciferol (VITAMIN D-3) 5000 units tablet, Take 1 tablet by mouth Daily., Disp: 120 tablet, Rfl: 3  •  Chromium (CHROMIUM GTF) 200 MCG tablet, Take 1 tablet by mouth 2 (Two) Times a Day With Meals., Disp: , Rfl:   •  ipratropium (ATROVENT) 0.06 % nasal spray, , Disp: , Rfl:   •  lisinopril-hydrochlorothiazide (PRINZIDE,ZESTORETIC) 20-12.5 MG per tablet, Take 1 tablet by mouth Daily., Disp: , Rfl: 1  •  metFORMIN ER (GLUCOPHAGE-XR) 500 MG 24 hr tablet, Take 1 tablet by mouth 2 (Two) Times a Day With Meals. Take 1 daily with dinner or bedtime for one week, then increase to 2 daily., Disp: 60 tablet, Rfl: 2  •  Multiple Vitamins-Minerals (MULTIVITAMIN ADULTS) tablet, Take 1 tablet by mouth Daily., Disp: , Rfl:   •  norethindrone (MICRONOR) 0.35 MG tablet, Take  by mouth Daily., Disp: , Rfl: 6  •  Omega-3 1000 MG capsule, Take 1,000 mg by mouth 2 (Two) Times a Day., Disp: , Rfl:   •  omeprazole (priLOSEC) 40 MG capsule, TK 1 C PO D, Disp: , Rfl: 0  •  potassium gluconate 595 (99 K) MG tablet tablet, Take 1 tablet by mouth Daily., Disp: 90 tablet, Rfl: 2  •  spironolactone (ALDACTONE) 25 MG tablet, Take 1 tablet by mouth Daily., Disp: 30 tablet, Rfl: 2  •  topiramate (TOPAMAX) 25 MG tablet, Take one daily at bedtime for a week then increase to 2 daily at bedtime, Disp: 60 tablet, Rfl: 2  •  Phendimetrazine Tartrate 35 MG tablet, Take 1 tablet at 3 pm., Disp: 28 each, Rfl:  0  •  phentermine (ADIPEX-P) 37.5 MG tablet, Take one tablet by mouth at 11 am., Disp: 30 tablet, Rfl: 0  No current facility-administered medications for this visit.     Physical Exam:    General:  .well developed; well nourished  no acute distress  obese    Lungs:  breathing is unlabored  clear to auscultation bilaterally   Heart:  regular rate and rhythm, S1, S2 normal, no murmur, click, rub or gallop       ASSESSMENT/PLAN:   Daisy was seen today for weight loss and nutrition counseling.    Diagnoses and all orders for this visit:    Other fatigue  -     cyanocobalamin injection 1,000 mcg    Obesity, Class III, BMI 40-49.9 (morbid obesity) (CMS/[facility])  -     phentermine (ADIPEX-P) 37.5 MG tablet; Take one tablet by mouth at 11 am.  -     Phendimetrazine Tartrate 35 MG tablet; Take 1 tablet at 3 pm.    Dysmetabolic syndrome    I have instructed the patient to continue with pursuit of medical weight loss as a part of this program. Continue nutritional focus and work towards new exercise FITT goal of:     Frequency   Intensity Time Strength Training   []   0 None  []   0 None  []   0 None  []   0 None    []   1 (1-2x/week) [x]   1 (light) []   1 (<10 min) [x]   1 (1x/week)   [x]   2 (3-5x/week) []   2 (moderate) []   2 (10-20 min) []   2 (2x/week)   []   3 (daily)   []   3 (moderately hard)  []   4 (very hard) [x]   3 (20-30 min)  []   4 (>30 min) []   3 (3-4x/week)       The current plan for this month includes: Keep up the great new habits of water, FJ with low carb high protein emphasis, ok to add afternoon protein based snack to avoid hunger before dinner. Add 35 in the afternoon. Will increase exercise by 1-2 days this month, will talk with  about new plan this weekend. Labs NV.      Plan of care reviewed with the patient at the conclusion of today's visit. We discussed the risks, benefits, and limitations of treatments. Continue medications and OTC supplements as discussed. Patient verbalizes  understanding of and agreement with management plan.      Return in about 1 month (around 9/29/2019) for Next scheduled follow up.     TANK Flowers

## 2019-09-18 ENCOUNTER — TRANSCRIBE ORDERS (OUTPATIENT)
Dept: ADMINISTRATIVE | Facility: HOSPITAL | Age: 49
End: 2019-09-18

## 2019-09-18 DIAGNOSIS — Z12.31 VISIT FOR SCREENING MAMMOGRAM: Primary | ICD-10-CM

## 2019-10-01 ENCOUNTER — OFFICE VISIT (OUTPATIENT)
Dept: BARIATRICS/WEIGHT MGMT | Facility: CLINIC | Age: 49
End: 2019-10-01

## 2019-10-01 ENCOUNTER — LAB (OUTPATIENT)
Dept: LAB | Facility: HOSPITAL | Age: 49
End: 2019-10-01

## 2019-10-01 VITALS
WEIGHT: 265 LBS | SYSTOLIC BLOOD PRESSURE: 126 MMHG | HEART RATE: 88 BPM | DIASTOLIC BLOOD PRESSURE: 90 MMHG | BODY MASS INDEX: 41.59 KG/M2 | HEIGHT: 67 IN

## 2019-10-01 DIAGNOSIS — E66.01 OBESITY, CLASS III, BMI 40-49.9 (MORBID OBESITY) (HCC): ICD-10-CM

## 2019-10-01 DIAGNOSIS — E55.9 VITAMIN D INSUFFICIENCY: ICD-10-CM

## 2019-10-01 DIAGNOSIS — E88.81 DYSMETABOLIC SYNDROME: ICD-10-CM

## 2019-10-01 DIAGNOSIS — R60.9 FLUID RETENTION: ICD-10-CM

## 2019-10-01 DIAGNOSIS — E55.9 VITAMIN D INSUFFICIENCY: Primary | ICD-10-CM

## 2019-10-01 DIAGNOSIS — R53.83 OTHER FATIGUE: ICD-10-CM

## 2019-10-01 LAB
25(OH)D3 SERPL-MCNC: 35.7 NG/ML (ref 30–100)
ALBUMIN SERPL-MCNC: 4.6 G/DL (ref 3.5–5.2)
ALBUMIN/GLOB SERPL: 1.6 G/DL
ALP SERPL-CCNC: 78 U/L (ref 39–117)
ALT SERPL W P-5'-P-CCNC: 32 U/L (ref 1–33)
ANION GAP SERPL CALCULATED.3IONS-SCNC: 14.3 MMOL/L (ref 5–15)
AST SERPL-CCNC: 26 U/L (ref 1–32)
BILIRUB SERPL-MCNC: 0.3 MG/DL (ref 0.2–1.2)
BUN BLD-MCNC: 13 MG/DL (ref 6–20)
BUN/CREAT SERPL: 15.5 (ref 7–25)
CALCIUM SPEC-SCNC: 9.8 MG/DL (ref 8.6–10.5)
CHLORIDE SERPL-SCNC: 100 MMOL/L (ref 98–107)
CHOLEST SERPL-MCNC: 222 MG/DL (ref 0–200)
CO2 SERPL-SCNC: 24.7 MMOL/L (ref 22–29)
CREAT BLD-MCNC: 0.84 MG/DL (ref 0.57–1)
GFR SERPL CREATININE-BSD FRML MDRD: 73 ML/MIN/1.73
GLOBULIN UR ELPH-MCNC: 2.8 GM/DL
GLUCOSE BLD-MCNC: 117 MG/DL (ref 65–99)
HDLC SERPL-MCNC: 44 MG/DL (ref 40–60)
LDLC SERPL CALC-MCNC: 140 MG/DL (ref 0–100)
LDLC/HDLC SERPL: 3.18 {RATIO}
POTASSIUM BLD-SCNC: 4 MMOL/L (ref 3.5–5.2)
PROT SERPL-MCNC: 7.4 G/DL (ref 6–8.5)
SODIUM BLD-SCNC: 139 MMOL/L (ref 136–145)
TRIGL SERPL-MCNC: 191 MG/DL (ref 0–150)
VLDLC SERPL-MCNC: 38.2 MG/DL (ref 5–40)

## 2019-10-01 PROCEDURE — 36415 COLL VENOUS BLD VENIPUNCTURE: CPT

## 2019-10-01 PROCEDURE — 80061 LIPID PANEL: CPT

## 2019-10-01 PROCEDURE — 80053 COMPREHEN METABOLIC PANEL: CPT

## 2019-10-01 PROCEDURE — MEDWTESTPT: Performed by: NURSE PRACTITIONER

## 2019-10-01 PROCEDURE — 82306 VITAMIN D 25 HYDROXY: CPT

## 2019-10-01 RX ORDER — PHENDIMETRAZINE TARTRATE 35 MG/1
TABLET ORAL
Qty: 28 EACH | Refills: 0 | Status: SHIPPED | OUTPATIENT
Start: 2019-10-01 | End: 2019-12-04 | Stop reason: RX

## 2019-10-01 RX ORDER — TOPIRAMATE 25 MG/1
TABLET ORAL
Qty: 60 TABLET | Refills: 2 | Status: SHIPPED | OUTPATIENT
Start: 2019-10-01 | End: 2020-02-11 | Stop reason: SDUPTHER

## 2019-10-01 RX ORDER — CYANOCOBALAMIN 1000 UG/ML
1000 INJECTION, SOLUTION INTRAMUSCULAR; SUBCUTANEOUS ONCE
Status: DISCONTINUED | OUTPATIENT
Start: 2019-10-01 | End: 2020-02-11

## 2019-10-01 RX ORDER — METFORMIN HYDROCHLORIDE 500 MG/1
500 TABLET, EXTENDED RELEASE ORAL 2 TIMES DAILY WITH MEALS
Qty: 60 TABLET | Refills: 2 | Status: SHIPPED | OUTPATIENT
Start: 2019-10-01 | End: 2019-12-04 | Stop reason: SDUPTHER

## 2019-10-01 RX ORDER — PHENTERMINE HYDROCHLORIDE 37.5 MG/1
TABLET ORAL
Qty: 30 TABLET | Refills: 0 | Status: SHIPPED | OUTPATIENT
Start: 2019-10-01 | End: 2019-12-04 | Stop reason: SDUPTHER

## 2019-10-01 NOTE — PROGRESS NOTES
[facility]   [facility] [address]  [place] [address]    Name: Daisy Tierney  : 1970  Patient Care Team:  Jane Ponce MD as PCP - General (Internal Medicine)    Chief Complaint;:   Chief Complaint   Patient presents with   • Weight Loss   • Nutrition Counseling        HPI      Daisy Tierney is a 48 y.o. female here to follow up in active weight loss. Patient is satisfied with weight loss progress and has no questions or concerns today.. There were no unexpected side effects.. There were no medication changes The changes in medical history were was diagnosed with UTI, now resolved. The patient is taking the recommended multivitamin and fish oil. Hunger control has remain unchanged The patient is exercising. The patient is using a food journal. The BMI is Body mass index is 41.5 kg/m².. New medication was helpful for appetite and plateau. Was able to increase exercise to 4 days/week, keeping up with 80-90oz water/day, daily FJ, weighing once weekly. Fasting for labs today. Highly focused, ready to start feeling better physically (with less knee pain and more energy).       The patient is exercising with a FITT score of:    Frequency   Intensity Time Strength Training   []   0 None  []   0 None  []   0 None  []   0 None    []   1 (1-2x/week) []   1 (light) []   1 (<10 min) []   1 (1x/week)   [x]   2 (3-5x/week) [x]   2 (moderate) []   2 (10-20 min) [x]   2 (2x/week)   []   3 (daily)   []   3 (moderately hard)  []   4 (very hard) []   3 (20-30 min)  [x]   4 (>30 min) []   3 (3-4x/week)       Change in weight since last visit: 4lbs lost    Current Weight: 265lbs .  Recent Weight History:  Wt Readings from Last 3 Encounters:   10/01/19 120 kg (265 lb)   19 122 kg (269 lb)   19 122 kg (268 lb)     Start Weight: 287lbs  Total Loss/%Loss of BBW: -7.67%    VS   Vitals:    10/01/19 0826   BP: 126/90   BP Location: Left arm   Patient Position:  "Sitting   Cuff Size: Large Adult   Pulse: 88   Weight: 120 kg (265 lb)   Height: 170.2 cm (67\")       Measurements (in inches)  Neck: 15  Chest: 44  Waist: 43.5  Hips: 53  Thighs: 46.5    Treatment Objectives   Weight Loss Goal: 5-10% loss of beginning body weight   Non-Weight Loss Goals: Improved blood glucose: has been addressed., Improved cholesterol: has been addressed. and Improved energy: has been addressed.     Past Medical History:   Diagnosis Date   • Allergic rhinitis    • GERD (gastroesophageal reflux disease)    • Hypertension        Patient Active Problem List   Diagnosis   • Chronic fatigue   • Fluid retention   • Vitamin D insufficiency   • Dysmetabolic syndrome       Allergies   Allergen Reactions   • Penicillins Itching         Current Outpatient Medications:   •  cetirizine (zyrTEC) 10 MG tablet, Take 1 tablet by mouth Daily., Disp: , Rfl: 2  •  Cholecalciferol (VITAMIN D-3) 5000 units tablet, Take 1 tablet by mouth Daily., Disp: 120 tablet, Rfl: 3  •  Chromium (CHROMIUM GTF) 200 MCG tablet, Take 1 tablet by mouth 2 (Two) Times a Day With Meals., Disp: , Rfl:   •  ipratropium (ATROVENT) 0.06 % nasal spray, , Disp: , Rfl:   •  lisinopril-hydrochlorothiazide (PRINZIDE,ZESTORETIC) 20-12.5 MG per tablet, Take 1 tablet by mouth Daily., Disp: , Rfl: 1  •  metFORMIN ER (GLUCOPHAGE-XR) 500 MG 24 hr tablet, Take 1 tablet by mouth 2 (Two) Times a Day With Meals. Take 1 daily with dinner or bedtime for one week, then increase to 2 daily., Disp: 60 tablet, Rfl: 2  •  Multiple Vitamins-Minerals (MULTIVITAMIN ADULTS) tablet, Take 1 tablet by mouth Daily., Disp: , Rfl:   •  norethindrone (MICRONOR) 0.35 MG tablet, Take  by mouth Daily., Disp: , Rfl: 6  •  Omega-3 1000 MG capsule, Take 1,000 mg by mouth 2 (Two) Times a Day., Disp: , Rfl:   •  omeprazole (priLOSEC) 40 MG capsule, TK 1 C PO D, Disp: , Rfl: 0  •  Phendimetrazine Tartrate 35 MG tablet, Take 1 tablet at 3 pm., Disp: 28 each, Rfl: 0  •  phentermine " (ADIPEX-P) 37.5 MG tablet, Take one tablet by mouth at 11 am., Disp: 30 tablet, Rfl: 0  •  potassium gluconate 595 (99 K) MG tablet tablet, Take 1 tablet by mouth Daily., Disp: 90 tablet, Rfl: 2  •  spironolactone (ALDACTONE) 25 MG tablet, Take 1 tablet by mouth Daily., Disp: 30 tablet, Rfl: 2  •  topiramate (TOPAMAX) 25 MG tablet, Take one daily at bedtime for a week then increase to 2 daily at bedtime, Disp: 60 tablet, Rfl: 2    Physical Exam:    General:  .well developed; well nourished  no acute distress  obese    Lungs:  breathing is unlabored  clear to auscultation bilaterally   Heart:  regular rate and rhythm, S1, S2 normal, no murmur, click, rub or gallop       ASSESSMENT/PLAN:   Daisy was seen today for weight loss and nutrition counseling.    Diagnoses and all orders for this visit:    Vitamin D insufficiency  -     Vitamin D 25 Hydroxy; Future    Obesity, Class III, BMI 40-49.9 (morbid obesity) (CMS/[facility])  -     Comprehensive Metabolic Panel; Future  -     Lipid Panel; Future  -     phentermine (ADIPEX-P) 37.5 MG tablet; Take one tablet by mouth at 11 am.  -     Phendimetrazine Tartrate 35 MG tablet; Take 1 tablet at 3 pm.  -     topiramate (TOPAMAX) 25 MG tablet; Take one daily at bedtime for a week then increase to 2 daily at bedtime    Fluid retention    Dysmetabolic syndrome  -     Comprehensive Metabolic Panel; Future  -     Lipid Panel; Future  -     metFORMIN ER (GLUCOPHAGE-XR) 500 MG 24 hr tablet; Take 1 tablet by mouth 2 (Two) Times a Day With Meals. Take 1 daily with dinner or bedtime for one week, then increase to 2 daily.    I have instructed the patient to continue with pursuit of medical weight loss as a part of this program. Continue nutritional focus and work towards new exercise FITT goal of:     Frequency   Intensity Time Strength Training   []   0 None  []   0 None  []   0 None  []   0 None    []   1 (1-2x/week) []   1 (light) []   1 (<10 min) []   1 (1x/week)   [x]   2 (3-5x/week) [x]    2 (moderate) []   2 (10-20 min) [x]   2 (2x/week)   []   3 (daily)   []   3 (moderately hard)  []   4 (very hard) []   3 (20-30 min)  [x]   4 (>30 min) []   3 (3-4x/week)       The current plan for this month includes: continue current exercise efforts, weight loss goal 4-6lbs this month and continue nutrition focus, plans to increase duration of exercise by 5-10 minutes. LEXIS. repeat labs today.     Plan of care reviewed with the patient at the conclusion of today's visit. We discussed the risks, benefits, and limitations of treatments. Continue medications and OTC supplements as discussed. Patient verbalizes understanding of and agreement with management plan.      Return in about 1 month (around 11/1/2019) for Next scheduled follow up, Labs this visit.     TANK Flowers

## 2019-11-08 ENCOUNTER — HOSPITAL ENCOUNTER (OUTPATIENT)
Dept: MAMMOGRAPHY | Facility: HOSPITAL | Age: 49
Discharge: HOME | End: 2019-11-08
Admitting: OBSTETRICS & GYNECOLOGY

## 2019-11-08 DIAGNOSIS — Z12.31 VISIT FOR SCREENING MAMMOGRAM: ICD-10-CM

## 2019-11-08 PROCEDURE — 77067 SCR MAMMO BI INCL CAD: CPT

## 2019-11-08 PROCEDURE — 77063 BREAST TOMOSYNTHESIS BI: CPT

## 2019-11-08 PROCEDURE — 77063 BREAST TOMOSYNTHESIS BI: CPT | Performed by: RADIOLOGY

## 2019-11-08 PROCEDURE — 77067 SCR MAMMO BI INCL CAD: CPT | Performed by: RADIOLOGY

## 2019-12-04 ENCOUNTER — OFFICE VISIT (OUTPATIENT)
Dept: BARIATRICS/WEIGHT MGMT | Facility: CLINIC | Age: 49
End: 2019-12-04

## 2019-12-04 VITALS
WEIGHT: 259 LBS | HEIGHT: 67 IN | BODY MASS INDEX: 40.65 KG/M2 | SYSTOLIC BLOOD PRESSURE: 124 MMHG | DIASTOLIC BLOOD PRESSURE: 78 MMHG | HEART RATE: 88 BPM

## 2019-12-04 DIAGNOSIS — E88.81 DYSMETABOLIC SYNDROME: ICD-10-CM

## 2019-12-04 DIAGNOSIS — E66.01 OBESITY, CLASS III, BMI 40-49.9 (MORBID OBESITY) (HCC): ICD-10-CM

## 2019-12-04 DIAGNOSIS — R73.03 PRE-DIABETES: Primary | ICD-10-CM

## 2019-12-04 PROCEDURE — MEDWTESTPT: Performed by: NURSE PRACTITIONER

## 2019-12-04 RX ORDER — METFORMIN HYDROCHLORIDE 500 MG/1
TABLET, EXTENDED RELEASE ORAL
Qty: 90 TABLET | Refills: 2 | Status: SHIPPED | OUTPATIENT
Start: 2019-12-04 | End: 2020-02-11 | Stop reason: SDUPTHER

## 2019-12-04 RX ORDER — PHENTERMINE HYDROCHLORIDE 37.5 MG/1
TABLET ORAL
Qty: 45 TABLET | Refills: 0 | Status: SHIPPED | OUTPATIENT
Start: 2019-12-04 | End: 2020-02-11 | Stop reason: SDUPTHER

## 2019-12-04 NOTE — PROGRESS NOTES
"[facility]   [facility] [address]  [place] [address]    Name: Daisy Tierney  : 1970  Patient Care Team:  Jane Ponce MD as PCP - General (Internal Medicine)    Chief Complaint;:   Chief Complaint   Patient presents with   • Weight Loss   • Nutrition Counseling        HPI   Ms. Daisy Tierney is a 49 y.o. female here to follow up in active weight loss. Patient is satisfied with weight loss progress and has no questions or concerns today.. There were no unexpected side effects. of medications. The patient is taking the recommended multivitamin and is taking fish oil.  The patient is using a food journal.  Body mass index is 40.57 kg/m².   Feels like she's realizing it's not a diet, it's a lifestyle.  The patient is exercising with a FITT score of:    Frequency Intensity Time Strength Training   []   0, none []   0 []   0 []   0   []   1 (1-2x/week) []   1 (light) []   1 (<10 min) []   1 (1x/week)   [x]   2 (3-5x/week) []   2 (moderate) []   2 (10-20 min) [x]   2 (2x/week)   []   3 (daily) [x]   3 (moderately hard)  []   4 (very hard) [x]   3 (20-30 min)  []   4 (>30 min) []   3 (3-4x/week)         VS   Vitals:    19 0933   BP: 124/78   BP Location: Left arm   Patient Position: Sitting   Cuff Size: Large Adult   Pulse: 88   Weight: 117 kg (259 lb)   Height: 170.2 cm (67\")       Weight history (in lbs)  Start Weight: 287lbs  Change in weight since last visit: 6lbs lost  Wt Readings from Last 3 Encounters:   19 117 kg (259 lb)   10/01/19 120 kg (265 lb)   19 122 kg (269 lb)       % of beginning body weight lost: -9.76%    Measurements (in inches)  Neck: 15  Chest: 43.5  Waist: 44  Hips: 52  Thighs: 46    History    Past Medical History:   Diagnosis Date   • Allergic rhinitis    • GERD (gastroesophageal reflux disease)    • Hypertension        Patient Active Problem List   Diagnosis   • Chronic fatigue   • Fluid retention   • " Vitamin D insufficiency   • Dysmetabolic syndrome   • Pre-diabetes       Allergies   Allergen Reactions   • Penicillins Itching         Current Outpatient Medications:   •  cetirizine (zyrTEC) 10 MG tablet, Take 1 tablet by mouth Daily., Disp: , Rfl: 2  •  Cholecalciferol (VITAMIN D-3) 5000 units tablet, Take 1 tablet by mouth Daily., Disp: 120 tablet, Rfl: 3  •  Chromium (CHROMIUM GTF) 200 MCG tablet, Take 1 tablet by mouth 2 (Two) Times a Day With Meals., Disp: , Rfl:   •  ipratropium (ATROVENT) 0.06 % nasal spray, , Disp: , Rfl:   •  lisinopril-hydrochlorothiazide (PRINZIDE,ZESTORETIC) 20-12.5 MG per tablet, Take 1 tablet by mouth Daily., Disp: , Rfl: 1  •  metFORMIN ER (GLUCOPHAGE-XR) 500 MG 24 hr tablet, Take 1 in the am and 2 in the pm, Disp: 90 tablet, Rfl: 2  •  Multiple Vitamins-Minerals (MULTIVITAMIN ADULTS) tablet, Take 1 tablet by mouth Daily., Disp: , Rfl:   •  norethindrone (MICRONOR) 0.35 MG tablet, Take  by mouth Daily., Disp: , Rfl: 6  •  Omega-3 1000 MG capsule, Take 1,000 mg by mouth 2 (Two) Times a Day., Disp: , Rfl:   •  omeprazole (priLOSEC) 40 MG capsule, TK 1 C PO D, Disp: , Rfl: 0  •  phentermine (ADIPEX-P) 37.5 MG tablet, Take one tablet by mouth at 11 am & 1/2 at 3pm, Disp: 45 tablet, Rfl: 0  •  potassium gluconate 595 (99 K) MG tablet tablet, Take 1 tablet by mouth Daily., Disp: 90 tablet, Rfl: 2  •  spironolactone (ALDACTONE) 25 MG tablet, Take 1 tablet by mouth Daily., Disp: 30 tablet, Rfl: 2  •  topiramate (TOPAMAX) 25 MG tablet, Take one daily at bedtime for a week then increase to 2 daily at bedtime, Disp: 60 tablet, Rfl: 2    Current Facility-Administered Medications:   •  cyanocobalamin injection 1,000 mcg, 1,000 mcg, Intramuscular, Once, Eva Jackson APRN    Physical Exam:    General:  .well developed; well nourished  no acute distress  obese    Lungs:  breathing is unlabored  clear to auscultation bilaterally   Heart:  regular rate and rhythm, S1, S2 normal, no murmur,  click, rub or gallop     Labs  Labs reviewed with patient:  Vitamin D is now normal but borderline low.  Will repeat prescription dosing, then go back to daily 5K IU  Blood glucose is still in the prediabetic range at 117.    Cholesterol is improving with weight loss    ASSESSMENT/PLAN:   Daisy was seen today for weight loss and nutrition counseling.    Diagnoses and all orders for this visit:    Pre-diabetes  -Continue with decreasing carbs, increasing resistance training, and weight loss.  Increase metformin to 1500 mg daily  Dysmetabolic syndrome  -     metFORMIN ER (GLUCOPHAGE-XR) 500 MG 24 hr tablet; Take 1 in the am and 2 in the pm    Obesity, Class III, BMI 40-49.9 (morbid obesity) (CMS/[facility])  -     phentermine (ADIPEX-P) 37.5 MG tablet; Take one tablet by mouth at 11 am & 1/2 at 3pm      Continue to work towards initial goal of loss of >10% of beginning body weight   We discussed a medication change due to phendimetrazine being backordered. We will hold phendimetrazine for now and increase phentermine    I have instructed patient to continue with pursuit of medical weight loss as a part of this program. Continue to work on lifestyle behavioral changes.  Patient does meet criteria for use of anorectics at this time as BMI >27.  The goal for this month includes: patient will strength train at least 3 days per week & increase water intake.    Continue nutritional focus and work towards new exercise FITT goal of:         Frequency Intensity Time Strength Training   []   0 []   0 []   0 []   0   []   1 []   1 []   1 []   1   [x]   2 []   2 []   2 [x]   2   []   3   [x]   3  []   4 []   3  [x]   4 []   3       Plan of care reviewed with patient at the conclusion of today's visit. We discussed the risks, benefits, and limitations of treatments. Patient verbalizes understanding of and agreement with management plan.     Return in about 4 weeks (around 1/1/2020).     TANK Blas

## 2020-02-11 ENCOUNTER — OFFICE VISIT (OUTPATIENT)
Dept: BARIATRICS/WEIGHT MGMT | Facility: CLINIC | Age: 50
End: 2020-02-11

## 2020-02-11 VITALS
DIASTOLIC BLOOD PRESSURE: 72 MMHG | SYSTOLIC BLOOD PRESSURE: 120 MMHG | RESPIRATION RATE: 18 BRPM | HEART RATE: 103 BPM | HEIGHT: 67 IN | WEIGHT: 259 LBS | OXYGEN SATURATION: 99 % | BODY MASS INDEX: 40.65 KG/M2 | TEMPERATURE: 96.8 F

## 2020-02-11 DIAGNOSIS — E66.01 OBESITY, CLASS III, BMI 40-49.9 (MORBID OBESITY) (HCC): ICD-10-CM

## 2020-02-11 DIAGNOSIS — R60.9 FLUID RETENTION: ICD-10-CM

## 2020-02-11 DIAGNOSIS — E88.81 DYSMETABOLIC SYNDROME: ICD-10-CM

## 2020-02-11 PROCEDURE — MEDWTESTPT: Performed by: NURSE PRACTITIONER

## 2020-02-11 RX ORDER — SPIRONOLACTONE 25 MG/1
25 TABLET ORAL DAILY
Qty: 30 TABLET | Refills: 2 | Status: SHIPPED | OUTPATIENT
Start: 2020-02-11 | End: 2022-06-02

## 2020-02-11 RX ORDER — PHENTERMINE HYDROCHLORIDE 37.5 MG/1
TABLET ORAL
Qty: 45 TABLET | Refills: 0 | Status: SHIPPED | OUTPATIENT
Start: 2020-02-11

## 2020-02-11 RX ORDER — TOPIRAMATE 25 MG/1
TABLET ORAL
Qty: 60 TABLET | Refills: 2 | Status: SHIPPED | OUTPATIENT
Start: 2020-02-11 | End: 2021-03-25

## 2020-02-11 RX ORDER — METFORMIN HYDROCHLORIDE 500 MG/1
TABLET, EXTENDED RELEASE ORAL
Qty: 90 TABLET | Refills: 2 | Status: SHIPPED | OUTPATIENT
Start: 2020-02-11

## 2020-02-11 RX ORDER — CYANOCOBALAMIN 1000 UG/ML
1000 INJECTION, SOLUTION INTRAMUSCULAR; SUBCUTANEOUS ONCE
Status: DISCONTINUED | OUTPATIENT
Start: 2020-02-11 | End: 2022-06-02

## 2020-02-11 NOTE — PROGRESS NOTES
"[facility]   [facility] [address]  [place] [address]    Name: Daisy Tierney  : 1970  Patient Care Team:  Jane Ponce MD as PCP - General (Internal Medicine)    Chief Complaint;:   Chief Complaint   Patient presents with   • Weight Loss   • Nutrition Counseling        HPI      Daisy Tierney is a 49 y.o. female here to follow up in active weight loss. Patient is satisfied with weight loss progress and has no questions or concerns today.. There were no unexpected side effects.. There were no medication changes There were no changes in medical or surgical history The patient is taking the recommended multivitamin and fish oil. Hunger control has remain unchanged The patient is exercising. The patient is using a food journal. The BMI is Body mass index is 40.57 kg/m²..     The patient is exercising with a FITT score of:    Frequency   Intensity Time Strength Training   []   0 None  []   0 None  []   0 None  []   0 None    []   1 (1-2x/week) []   1 (light) []   1 (<10 min) []   1 (1x/week)   [x]   2 (3-5x/week) []   2 (moderate) []   2 (10-20 min) []   2 (2x/week)   []   3 (daily)   [x]   3 (moderately hard)  []   4 (very hard) []   3 (20-30 min)  [x]   4 (>30 min) [x]   3 (3-4x/week)       Change in weight since last visit: Same    Current Weight: 259lb  Recent Weight History:    Wt Readings from Last 3 Encounters:   20 117 kg (259 lb)   19 117 kg (259 lb)   10/01/19 120 kg (265 lb)     Start Weight: 287lb  Total Loss/%Loss of BBW: -9.76%    VS   Vitals:    20 0907   BP: 120/72   BP Location: Left arm   Patient Position: Sitting   Cuff Size: Large Adult   Pulse: 103   Resp: 18   Temp: 96.8 °F (36 °C)   TempSrc: Temporal   SpO2: 99%   Weight: 117 kg (259 lb)   Height: 170.2 cm (67\")       Measurements (in inches)  Waist: 44    Treatment Objectives   Weight Loss Goal: 5-10% loss of beginning body weight   Non-Weight Loss Goals: " Improved blood glucose: has been addressed., Improved blood pressure: has been addressed., Improved reflux: has been addressed. and Improved energy: has been addressed.     Past Medical History:   Diagnosis Date   • Allergic rhinitis    • GERD (gastroesophageal reflux disease)    • Hypertension        Patient Active Problem List   Diagnosis   • Chronic fatigue   • Fluid retention   • Vitamin D insufficiency   • Dysmetabolic syndrome   • Pre-diabetes       Allergies   Allergen Reactions   • Penicillins Itching         Current Outpatient Medications:   •  cetirizine (zyrTEC) 10 MG tablet, Take 1 tablet by mouth Daily., Disp: , Rfl: 2  •  Cholecalciferol (VITAMIN D-3) 5000 units tablet, Take 1 tablet by mouth Daily., Disp: 120 tablet, Rfl: 3  •  Chromium (CHROMIUM GTF) 200 MCG tablet, Take 1 tablet by mouth 2 (Two) Times a Day With Meals., Disp: , Rfl:   •  ipratropium (ATROVENT) 0.06 % nasal spray, , Disp: , Rfl:   •  lisinopril-hydrochlorothiazide (PRINZIDE,ZESTORETIC) 20-12.5 MG per tablet, Take 1 tablet by mouth Daily., Disp: , Rfl: 1  •  metFORMIN ER (GLUCOPHAGE-XR) 500 MG 24 hr tablet, Take 1 in the am and 2 in the pm, Disp: 90 tablet, Rfl: 2  •  Multiple Vitamins-Minerals (MULTIVITAMIN ADULTS) tablet, Take 1 tablet by mouth Daily., Disp: , Rfl:   •  norethindrone (MICRONOR) 0.35 MG tablet, Take  by mouth Daily., Disp: , Rfl: 6  •  Omega-3 1000 MG capsule, Take 1,000 mg by mouth 2 (Two) Times a Day., Disp: , Rfl:   •  omeprazole (priLOSEC) 40 MG capsule, TK 1 C PO D, Disp: , Rfl: 0  •  phentermine (ADIPEX-P) 37.5 MG tablet, Take one tablet by mouth at 11 am & 1/2 at 3pm, Disp: 45 tablet, Rfl: 0  •  potassium gluconate 595 (99 K) MG tablet tablet, Take 1 tablet by mouth Daily., Disp: 90 tablet, Rfl: 2  •  spironolactone (ALDACTONE) 25 MG tablet, Take 1 tablet by mouth Daily., Disp: 30 tablet, Rfl: 2  •  topiramate (TOPAMAX) 25 MG tablet, Take one daily at bedtime for a week then increase to 2 daily at bedtime,  Disp: 60 tablet, Rfl: 2    Current Facility-Administered Medications:   •  cyanocobalamin injection 1,000 mcg, 1,000 mcg, Intramuscular, Once, Eva Jackson, TANK    Physical Exam:    General:  .well developed; well nourished  no acute distress  obese    Lungs:  breathing is unlabored  clear to auscultation bilaterally   Heart:  regular rate and rhythm, S1, S2 normal, no murmur, click, rub or gallop       ASSESSMENT/PLAN:   Daisy was seen today for weight loss and nutrition counseling.    Diagnoses and all orders for this visit:    Dysmetabolic syndrome  -     metFORMIN ER (GLUCOPHAGE-XR) 500 MG 24 hr tablet; Take 1 in the am and 2 in the pm  -     cyanocobalamin injection 1,000 mcg    Obesity, Class III, BMI 40-49.9 (morbid obesity) (CMS/HCC)  -     phentermine (ADIPEX-P) 37.5 MG tablet; Take one tablet by mouth at 11 am & 1/2 at 3pm  -     topiramate (TOPAMAX) 25 MG tablet; Take one daily at bedtime for a week then increase to 2 daily at bedtime    Fluid retention  -     spironolactone (ALDACTONE) 25 MG tablet; Take 1 tablet by mouth Daily.    I have instructed the patient to continue with pursuit of medical weight loss as a part of this program. Patient does meet criteria for use of anorectics at this time as BMI >27. Continue nutritional focus and work towards new exercise FITT goal of:     Frequency   Intensity Time Strength Training   []   0 None  []   0 None  []   0 None  []   0 None    []   1 (1-2x/week) []   1 (light) []   1 (<10 min) []   1 (1x/week)   [x]   2 (3-5x/week) []   2 (moderate) []   2 (10-20 min) []   2 (2x/week)   []   3 (daily)   [x]   3 (moderately hard)  []   4 (very hard) []   3 (20-30 min)  [x]   4 (>30 min) [x]   3 (3-4x/week)       The current plan for this month includes: Keep up the excellent focus and pre-planning/prepping. Continue daily food journal . Repeat CMP NV.     Plan of care reviewed with the patient at the conclusion of today's visit. We discussed the risks, benefits,  and limitations of treatments. Continue medications and OTC supplements as discussed. Patient verbalizes understanding of and agreement with management plan.      Return in about 1 month (around 3/11/2020) for Next scheduled follow up.     TANK Flowers

## 2020-10-01 ENCOUNTER — TRANSCRIBE ORDERS (OUTPATIENT)
Dept: OBSTETRICS AND GYNECOLOGY | Facility: CLINIC | Age: 50
End: 2020-10-01

## 2020-10-01 ENCOUNTER — TRANSCRIBE ORDERS (OUTPATIENT)
Dept: ADMINISTRATIVE | Facility: HOSPITAL | Age: 50
End: 2020-10-01

## 2020-10-01 DIAGNOSIS — Z12.31 VISIT FOR SCREENING MAMMOGRAM: Primary | ICD-10-CM

## 2021-01-05 ENCOUNTER — HOSPITAL ENCOUNTER (OUTPATIENT)
Dept: MAMMOGRAPHY | Facility: HOSPITAL | Age: 51
Discharge: HOME | End: 2021-01-05
Admitting: OBSTETRICS & GYNECOLOGY

## 2021-01-05 DIAGNOSIS — Z12.31 VISIT FOR SCREENING MAMMOGRAM: ICD-10-CM

## 2021-01-05 PROCEDURE — 77067 SCR MAMMO BI INCL CAD: CPT | Performed by: RADIOLOGY

## 2021-01-05 PROCEDURE — 77063 BREAST TOMOSYNTHESIS BI: CPT

## 2021-01-05 PROCEDURE — 77063 BREAST TOMOSYNTHESIS BI: CPT | Performed by: RADIOLOGY

## 2021-01-05 PROCEDURE — 77067 SCR MAMMO BI INCL CAD: CPT

## 2021-03-01 ENCOUNTER — TELEPHONE (OUTPATIENT)
Dept: OBSTETRICS AND GYNECOLOGY | Facility: CLINIC | Age: 51
End: 2021-03-01

## 2021-03-01 RX ORDER — ACETAMINOPHEN AND CODEINE PHOSPHATE 120; 12 MG/5ML; MG/5ML
1 SOLUTION ORAL DAILY
Qty: 28 TABLET | Refills: 1 | Status: SHIPPED | OUTPATIENT
Start: 2021-03-01 | End: 2021-03-25 | Stop reason: SDUPTHER

## 2021-03-01 NOTE — TELEPHONE ENCOUNTER
patient called and left a voicemail about refilling her birth control, she didn't say what it was called and I didn't see it in the chart     Rx yohan refilled. Has appt. Scheduled 3/25/21

## 2021-03-25 ENCOUNTER — OFFICE VISIT (OUTPATIENT)
Dept: OBSTETRICS AND GYNECOLOGY | Facility: CLINIC | Age: 51
End: 2021-03-25

## 2021-03-25 VITALS
SYSTOLIC BLOOD PRESSURE: 124 MMHG | WEIGHT: 293 LBS | BODY MASS INDEX: 45.99 KG/M2 | HEIGHT: 67 IN | DIASTOLIC BLOOD PRESSURE: 82 MMHG

## 2021-03-25 DIAGNOSIS — Z80.3 FAMILY HISTORY OF BREAST CANCER: ICD-10-CM

## 2021-03-25 DIAGNOSIS — Z12.31 BREAST CANCER SCREENING BY MAMMOGRAM: ICD-10-CM

## 2021-03-25 DIAGNOSIS — Z30.40 ENCOUNTER FOR REFILL OF PRESCRIPTION FOR CONTRACEPTION: ICD-10-CM

## 2021-03-25 DIAGNOSIS — Z01.419 ENCOUNTER FOR GYNECOLOGICAL EXAMINATION WITHOUT ABNORMAL FINDING: ICD-10-CM

## 2021-03-25 DIAGNOSIS — Z01.419 PAP TEST, AS PART OF ROUTINE GYNECOLOGICAL EXAMINATION: Primary | ICD-10-CM

## 2021-03-25 DIAGNOSIS — E66.01 MORBID OBESITY WITH BMI OF 45.0-49.9, ADULT (HCC): ICD-10-CM

## 2021-03-25 PROCEDURE — 99396 PREV VISIT EST AGE 40-64: CPT | Performed by: OBSTETRICS & GYNECOLOGY

## 2021-03-25 RX ORDER — IPRATROPIUM BROMIDE 21 UG/1
1 SPRAY, METERED NASAL 2 TIMES DAILY
COMMUNITY
Start: 2021-03-01

## 2021-03-25 RX ORDER — FENOFIBRATE 48 MG/1
48 TABLET, COATED ORAL DAILY
COMMUNITY
Start: 2021-03-05

## 2021-03-25 RX ORDER — ACETAMINOPHEN AND CODEINE PHOSPHATE 120; 12 MG/5ML; MG/5ML
1 SOLUTION ORAL DAILY
Qty: 28 TABLET | Refills: 1 | Status: SHIPPED | OUTPATIENT
Start: 2021-03-25 | End: 2021-04-22

## 2021-03-25 RX ORDER — OMEPRAZOLE 20 MG/1
20 CAPSULE, DELAYED RELEASE ORAL
COMMUNITY
Start: 2021-03-11

## 2021-03-25 NOTE — PATIENT INSTRUCTIONS
Breast Self-Awareness  Breast self-awareness is knowing how your breasts look and feel. Doing breast self-awareness is important. It allows you to catch a breast problem early while it is still small and can be treated. All women should do breast self-awareness, including women who have had breast implants. Tell your doctor if you notice a change in your breasts.  What you need:  · A mirror.  · A well-lit room.  How to do a breast self-exam  A breast self-exam is one way to learn what is normal for your breasts and to check for changes. To do a breast self-exam:  Look for changes    1. Take off all the clothes above your waist.  2.  front of a mirror in a room with good lighting.  3. Put your hands on your hips.  4. Push your hands down.  5. Look at your breasts and nipples in the mirror to see if one breast or nipple looks different from the other. Check to see if:  ? The shape of one breast is different.  ? The size of one breast is different.  ? There are wrinkles, dips, and bumps in one breast and not the other.  6. Look at each breast for changes in the skin, such as:  ? Redness.  ? Scaly areas.  7. Look for changes in your nipples, such as:  ? Liquid around the nipples.  ? Bleeding.  ? Dimpling.  ? Redness.  ? A change in where the nipples are.  Feel for changes    1. Lie on your back on the floor.  2. Feel each breast. To do this, follow these steps:  ? Pick a breast to feel.  ? Put the arm closest to that breast above your head.  ? Use your other arm to feel the nipple area of your breast. Feel the area with the pads of your three middle fingers by making small circles with your fingers. For the first [place], press lightly. For the second [place], press harder. For the third [place], press even harder.  ? Keep making circles with your fingers at the different pressures as you move down your breast. Stop when you feel your ribs.  ? Move your fingers a little toward the center of your body.  ? Start  making circles with your fingers again, this time going up until you reach your collarbone.  ? Keep making up-and-down circles until you reach your armpit. Remember to keep using the three pressures.  ? Feel the other breast in the same way.  3. Sit or  the tub or shower.  4. With soapy water on your skin, feel each breast the same way you did in step 2 when you were lying on the floor.  Write down what you find  Writing down what you find can help you remember what to tell your doctor. Write down:  · What is normal for each breast.  · Any changes you find in each breast, including:  ? The kind of changes you find.  ? Whether you have pain.  ? Size and location of any lumps.  · When you last had your menstrual period.  General tips  · Check your breasts every month.  · If you are breastfeeding, the best time to check your breasts is after you feed your baby or after you use a breast pump.  · If you get menstrual periods, the best time to check your breasts is 5-7 days after your menstrual period is over.  · With time, you will become comfortable with the self-exam, and you will begin to know if there are changes in your breasts.  Contact a doctor if you:  · See a change in the shape or size of your breasts or nipples.  · See a change in the skin of your breast or nipples, such as red or scaly skin.  · Have fluid coming from your nipples that is not normal.  · Find a lump or thick area that was not there before.  · Have pain in your breasts.  · Have any concerns about your breast health.  Summary  · Breast self-awareness includes looking for changes in your breasts, as well as feeling for changes within your breasts.  · Breast self-awareness should be done in front of a mirror in a well-lit room.  · You should check your breasts every month. If you get menstrual periods, the best time to check your breasts is 5-7 days after your menstrual period is over.  · Let your doctor know of any changes you see in your  breasts, including changes in size, changes on the skin, pain or tenderness, or fluid from your nipples that is not normal.  This information is not intended to replace advice given to you by your health care provider. Make sure you discuss any questions you have with your health care provider.  Document Revised: 08/06/2019 Document Reviewed: 08/06/2019  [employer]vier Patient Education © 2021 [employer]

## 2021-03-25 NOTE — PROGRESS NOTES
GYN Annual Exam     CC - Here for annual exam.     Subjective     HPI  Daisy Tierney is a 50 y.o. female, , who presents for annual well woman exam.  She is premenopausal.   Patient's last menstrual period was 2021..  Periods are regular every 25-35 days, lasting 3 days.  Dysmenorrhea:mild, occurring first 1-2 days of flow.  Patient reports problems with: none.  Partner Status: Marital Status: .  New Partners since last visit: no.  The patient Denies vasomotor symptoms.     The patient does not have any complaints today.    She exercises regularly: yes.  She has concerns about domestic violence: no.      Additional OB/GYN History   Current contraception: contraceptive methods: OCP (estrogen/progesterone)  Desires to: continue contraception  Last Pap :   Last Completed Pap Smear       Status Date      PAP SMEAR Done 2020 negative; last HPV regardless 10/11/2018        History of abnormal Pap smear: no  Family history of uterine, colon, breast, or ovarian cancer: yes - mother- breast cancer  Performs monthly Self-Breast Exam: yes  Last mammogram:   Last Completed Mammogram       Status Date      MAMMOGRAM Done 2021 MAMMO SCREENING DIGITAL TOMOSYNTHESIS BILATERAL W CAD     Patient has more history with this topic...        Last colonoscopy:   Last Completed Colonoscopy       Status Date      COLONOSCOPY Done 2008 performed with Dr. Estrada; irritable bowel syndrome        Last DEXA: never   Exercises Regularly: yes  Feelings of Anxiety or Depression: no    Tobacco Usage?: No   OB History        4    Para   2    Term   2            AB   2    Living   2       SAB   2    TAB        Ectopic        Molar        Multiple        Live Births   2                Health Maintenance   Topic Date Due   • Annual Gynecologic Pelvic and Breast Exam  Never done   • ANNUAL PHYSICAL  Never done   • TDAP/TD VACCINES (1 - Tdap) Never done   • COLONOSCOPY  2018   • HEPATITIS C  "SCREENING  Never done   • INFLUENZA VACCINE  Never done   • LIPID PANEL  10/01/2020   • MAMMOGRAM  2022   • PAP SMEAR  2023   • Pneumococcal Vaccine 0-64  Aged Out   • MENINGOCOCCAL VACCINE  Aged Out       The additional following portions of the patient's history were reviewed and updated as appropriate: allergies, current medications, past family history, past medical history, past social history and past surgical history.    Past Medical History:   Diagnosis Date   • Allergic rhinitis    • GERD (gastroesophageal reflux disease)    • History of gestational diabetes    • History of pregnancy induced hypertension    • History of  labor    • Hypertension    • Irritable bowel syndrome         Past Surgical History:   Procedure Laterality Date   • NO PAST SURGERIES          Review of Systems   Constitutional: Negative.    HENT: Negative.    Eyes: Negative.    Respiratory: Negative.    Cardiovascular: Negative.    Gastrointestinal: Negative.    Endocrine: Negative.    Genitourinary: Negative.    Musculoskeletal: Negative.    Skin: Negative.    Allergic/Immunologic: Negative.    Neurological: Negative.    Hematological: Negative.    Psychiatric/Behavioral: Negative.        I have reviewed and agree with the HPI, ROS, and historical information as entered above. Kendra Pinto MD    Objective   /82   Ht 170.2 cm (67\")   Wt (!) 140 kg (308 lb 9.6 oz)   LMP 2021   BMI 48.33 kg/m²     Physical Exam  Vitals and nursing note reviewed. Exam conducted with a chaperone present.   Constitutional:       General: She is awake.   HENT:      Head: Normocephalic and atraumatic.   Neck:      Thyroid: No thyroid mass, thyromegaly or thyroid tenderness.   Cardiovascular:      Rate and Rhythm: Normal rate.      Heart sounds: No murmur heard.   No friction rub. No gallop.    Pulmonary:      Effort: Pulmonary effort is normal.      Breath sounds: Normal breath sounds. No stridor. No wheezing, rhonchi or " rales.   Chest:      Chest wall: No mass or tenderness.      Breasts:         Right: Normal. No bleeding, inverted nipple, mass, nipple discharge, skin change or tenderness.         Left: Normal. No bleeding, inverted nipple, mass, nipple discharge, skin change or tenderness.   Abdominal:      Palpations: Abdomen is soft.      Tenderness: There is no guarding or rebound.      Hernia: There is no hernia in the left inguinal area or right inguinal area.   Genitourinary:     General: Normal vulva.      Pubic Area: No rash.       Labia:         Right: No rash, tenderness, lesion or injury.         Left: No rash, tenderness, lesion or injury.       Urethra: No prolapse, urethral swelling or urethral lesion.      Vagina: No foreign body. No vaginal discharge, erythema, tenderness, bleeding or lesions.      Cervix: No cervical motion tenderness, discharge, friability, lesion, erythema or cervical bleeding.      Uterus: Normal. Not deviated, not enlarged, not fixed and not tender.       Adnexa: Right adnexa normal and left adnexa normal.        Right: No mass, tenderness or fullness.          Left: No mass, tenderness or fullness.        Rectum: No external hemorrhoid.   Musculoskeletal:      Cervical back: Normal range of motion.   Lymphadenopathy:      Upper Body:      Right upper body: No supraclavicular or axillary adenopathy.      Left upper body: No supraclavicular or axillary adenopathy.   Skin:     General: Skin is warm.   Neurological:      Mental Status: She is alert and oriented to person, place, and time.   Psychiatric:         Mood and Affect: Mood and affect normal.         Speech: Speech normal.         Assessment/Plan     Assessment     Problem List Items Addressed This Visit        Endocrine and Metabolic    Morbid obesity with BMI of 45.0-49.9, adult (CMS/[facility])       Family History    Family history of breast cancer    Overview     Mother diagnosed at age 67    Pt's Jackie Risk 18%    Getting annual mammogram  and Breast MRI         Relevant Orders    MRI Breast Bilateral With & Without Contrast      Other Visit Diagnoses     Pap test, as part of routine gynecological examination    -  Primary    Relevant Orders    Pap IG, HPV-hr    Encounter for gynecological examination without abnormal finding        Breast cancer screening by mammogram        Relevant Orders    Mammo Screening Digital Tomosynthesis Bilateral With CAD    MRI Breast Bilateral With & Without Contrast    Encounter for refill of prescription for contraception        Relevant Medications    norethindrone (MICRONOR) 0.35 MG tablet          Plan     1. Reviewed monthly self breast exams.  Instructed to call with lumps, pain, or breast discharge.  Yearly mammograms ordered.  2. Ordered mammogram today.  3. Recommended use of Vitamin D and getting adequate calcium in her diet. (1500mg)  4. Colonoscopy recommended.  5. RTC in 1 year or PRN with problems.  6. Pt plans to scheduled colonoscopy with Dr. Godinez  7. Doing well on POP    Kendra Pinto MD  03/25/2021

## 2021-03-30 DIAGNOSIS — Z01.419 PAP TEST, AS PART OF ROUTINE GYNECOLOGICAL EXAMINATION: ICD-10-CM

## 2021-04-22 ENCOUNTER — TRANSCRIBE ORDERS (OUTPATIENT)
Dept: ADMINISTRATIVE | Facility: HOSPITAL | Age: 51
End: 2021-04-22

## 2021-04-22 DIAGNOSIS — Z30.40 ENCOUNTER FOR REFILL OF PRESCRIPTION FOR CONTRACEPTION: ICD-10-CM

## 2021-04-22 DIAGNOSIS — Z12.31 VISIT FOR SCREENING MAMMOGRAM: Primary | ICD-10-CM

## 2021-04-22 RX ORDER — ACETAMINOPHEN AND CODEINE PHOSPHATE 120; 12 MG/5ML; MG/5ML
1 SOLUTION ORAL DAILY
Qty: 84 TABLET | Refills: 3 | Status: SHIPPED | OUTPATIENT
Start: 2021-04-22 | End: 2022-04-15

## 2021-04-28 ENCOUNTER — HOSPITAL ENCOUNTER (OUTPATIENT)
Dept: MRI IMAGING | Facility: HOSPITAL | Age: 51
End: 2021-04-28

## 2022-02-01 ENCOUNTER — HOSPITAL ENCOUNTER (OUTPATIENT)
Dept: MAMMOGRAPHY | Facility: HOSPITAL | Age: 52
Discharge: HOME | End: 2022-02-01
Admitting: OBSTETRICS & GYNECOLOGY

## 2022-02-01 DIAGNOSIS — Z12.31 VISIT FOR SCREENING MAMMOGRAM: ICD-10-CM

## 2022-02-01 PROCEDURE — 77067 SCR MAMMO BI INCL CAD: CPT | Performed by: RADIOLOGY

## 2022-02-01 PROCEDURE — 77063 BREAST TOMOSYNTHESIS BI: CPT | Performed by: RADIOLOGY

## 2022-02-01 PROCEDURE — 77067 SCR MAMMO BI INCL CAD: CPT

## 2022-02-01 PROCEDURE — 77063 BREAST TOMOSYNTHESIS BI: CPT

## 2022-04-15 DIAGNOSIS — Z30.40 ENCOUNTER FOR REFILL OF PRESCRIPTION FOR CONTRACEPTION: ICD-10-CM

## 2022-04-15 RX ORDER — ACETAMINOPHEN AND CODEINE PHOSPHATE 120; 12 MG/5ML; MG/5ML
1 SOLUTION ORAL DAILY
Qty: 84 TABLET | Refills: 0 | Status: SHIPPED | OUTPATIENT
Start: 2022-04-15 | End: 2022-06-02 | Stop reason: SDUPTHER

## 2022-06-02 ENCOUNTER — OFFICE VISIT (OUTPATIENT)
Dept: OBSTETRICS AND GYNECOLOGY | Facility: CLINIC | Age: 52
End: 2022-06-02

## 2022-06-02 VITALS
HEIGHT: 67 IN | BODY MASS INDEX: 45.99 KG/M2 | DIASTOLIC BLOOD PRESSURE: 82 MMHG | SYSTOLIC BLOOD PRESSURE: 142 MMHG | WEIGHT: 293 LBS

## 2022-06-02 DIAGNOSIS — Z30.40 ENCOUNTER FOR REFILL OF PRESCRIPTION FOR CONTRACEPTION: ICD-10-CM

## 2022-06-02 DIAGNOSIS — Z12.31 BREAST CANCER SCREENING BY MAMMOGRAM: ICD-10-CM

## 2022-06-02 DIAGNOSIS — Z01.419 ENCOUNTER FOR GYNECOLOGICAL EXAMINATION WITHOUT ABNORMAL FINDING: Primary | ICD-10-CM

## 2022-06-02 DIAGNOSIS — Z80.3 FAMILY HISTORY OF BREAST CANCER: ICD-10-CM

## 2022-06-02 PROCEDURE — 99396 PREV VISIT EST AGE 40-64: CPT | Performed by: OBSTETRICS & GYNECOLOGY

## 2022-06-02 RX ORDER — ACETAMINOPHEN AND CODEINE PHOSPHATE 120; 12 MG/5ML; MG/5ML
1 SOLUTION ORAL DAILY
Qty: 84 TABLET | Refills: 3 | Status: SHIPPED | OUTPATIENT
Start: 2022-06-02

## 2022-06-02 NOTE — PROGRESS NOTES
Gynecologic Annual Exam Note      CC- Here for annual exam    Subjective     Daisy Tierney is a 51 y.o. female established patient who presents for annual well woman exam. Patient's last menstrual period was 2022 (exact date).. Her periods are irregular, lasting 3 days and are moderate and clots are absent.  She reports moderate dysmenorrhea.    Partner Status: Marital Status: . New Partners since last visit: no.  Desires STD Screening: no.   Current contraception: oral progesterone-only contraceptive  The patient is happy with her current contraceptive method.      The patient has complaints today. Patient reports irregular periods. She had a period in April and May 2022 but prior to that she had not had one since Oct 2021. She also reports with her periods back pain and stabbing pains to either side of pelvis near ovary. She reports having these pains even without bleeding during the time she should have a period.    She exercises regularly: yes.-walking and aquatics   She has concerns about domestic violence: no.      OB History        4    Para   2    Term   2       0    AB   2    Living   2       SAB   2    IAB   0    Ectopic   0    Molar   0    Multiple   0    Live Births   2              Performs monthly Self-Breast Exam: yes  History of abnormal Pap smear: no  Family history of uterine, colon or ovarian cancer: no  Family history of breast cancer: yes - mother- breast cancer  History of abnormal mammogram: no  Feelings of Anxiety or Depression:no   Tobacco Usage?: No       Last Pap : 3/25/21- neg, HPV neg  Last Completed Pap Smear          PAP SMEAR (Every 3 Years) Next due on 3/25/2024    2021  SCANNED - PAP SMEAR    2020  Done - negative; last HPV regardless 10/11/2018              Last mammogram: 22- normal   Last Completed Mammogram          Ordered - MAMMOGRAM (Yearly) Ordered on 2022  Mammo Screening Digital Tomosynthesis  "Bilateral With CAD    2021  Mammo Screening Digital Tomosynthesis Bilateral With CAD    2019  Mammo Screening Digital Tomosynthesis Bilateral With CAD    10/16/2018  Mammo screening digital tomosynthesis bilateral w CAD    10/19/2017  Mammo Screening Digital Tomosynthesis Bilateral With CAD    Only the first 5 history entries have been loaded, but more history exists.              Last colonoscopy:   Last Completed Colonoscopy     This patient has no relevant Health Maintenance data.              The following portions of the patient's history were reviewed and updated as appropriate: allergies, current medications, past family history, past medical history, past social history, past surgical history and problem list.    Past Medical History:   Diagnosis Date   • Allergic rhinitis    • GERD (gastroesophageal reflux disease)    • History of gestational diabetes    • History of pregnancy induced hypertension    • History of  labor    • Hypertension    • Irritable bowel syndrome        Past Surgical History:   Procedure Laterality Date   • NO PAST SURGERIES         Review of Systems  Review of Systems   Constitutional: Negative.    HENT: Negative.    Eyes: Negative.    Respiratory: Negative.    Cardiovascular: Negative.    Gastrointestinal: Negative.    Endocrine: Negative.    Genitourinary: Positive for menstrual problem (irregular periods ) and pelvic pain.   Musculoskeletal: Negative.    Skin: Negative.    Allergic/Immunologic: Negative.    Neurological: Negative.    Hematological: Negative.    Psychiatric/Behavioral: Negative.        Objective     /82   Ht 170.2 cm (67\")   Wt 135 kg (296 lb 9.6 oz)   LMP 2022 (Exact Date)   BMI 46.45 kg/m²       Physical Exam  Vitals and nursing note reviewed. Exam conducted with a chaperone present.   Constitutional:       General: She is awake.   HENT:      Head: Normocephalic and atraumatic.   Neck:      Thyroid: No thyroid mass, " thyromegaly or thyroid tenderness.   Cardiovascular:      Rate and Rhythm: Normal rate.      Heart sounds: No murmur heard.    No friction rub. No gallop.   Pulmonary:      Effort: Pulmonary effort is normal.      Breath sounds: Normal breath sounds. No stridor. No wheezing, rhonchi or rales.   Chest:      Chest wall: No mass or tenderness.   Breasts:      Right: Normal. No bleeding, inverted nipple, mass, nipple discharge, skin change, tenderness, axillary adenopathy or supraclavicular adenopathy.      Left: Normal. No bleeding, inverted nipple, mass, nipple discharge, skin change, tenderness, axillary adenopathy or supraclavicular adenopathy.       Abdominal:      Palpations: Abdomen is soft.      Tenderness: There is no guarding or rebound.      Hernia: There is no hernia in the left inguinal area or right inguinal area.   Genitourinary:     General: Normal vulva.      Pubic Area: No rash.       Labia:         Right: No rash, tenderness, lesion or injury.         Left: No rash, tenderness, lesion or injury.       Urethra: No prolapse, urethral swelling or urethral lesion.      Vagina: No foreign body. No vaginal discharge, erythema, tenderness, bleeding or lesions.      Cervix: No cervical motion tenderness, discharge, friability, lesion, erythema or cervical bleeding.      Uterus: Normal. Not deviated, not enlarged, not fixed and not tender.       Adnexa: Right adnexa normal and left adnexa normal.        Right: No mass, tenderness or fullness.          Left: No mass, tenderness or fullness.        Rectum: No external hemorrhoid.   Musculoskeletal:      Cervical back: Normal range of motion.   Lymphadenopathy:      Upper Body:      Right upper body: No supraclavicular or axillary adenopathy.      Left upper body: No supraclavicular or axillary adenopathy.   Skin:     General: Skin is warm.   Neurological:      Mental Status: She is alert and oriented to person, place, and time.   Psychiatric:         Mood and  Affect: Mood and affect normal.         Speech: Speech normal.         Assessment     Assessment     Problem List Items Addressed This Visit        Family History    Family history of breast cancer    Overview     Mother diagnosed at age 67    Pt's Jackie Risk 18%    Getting annual mammogram and Breast MRI           Relevant Orders    MRI Breast Bilateral Screening With & Without Contrast      Other Visit Diagnoses     Encounter for gynecological examination without abnormal finding    -  Primary    Encounter for refill of prescription for contraception        Relevant Medications    norethindrone (MICRONOR) 0.35 MG tablet    Breast cancer screening by mammogram        Relevant Orders    Mammo Screening Digital Tomosynthesis Bilateral With CAD            Plan     1. Reviewed monthly self breast exams.  Instructed to call with lumps, pain, or breast discharge.    2. RTC in 1 year or PRN with problems.  3. Doing well on POP, will continue this  4. Pap normal with HPV neg 2021.      Kendra Pinto MD  06/02/2022

## 2022-06-09 ENCOUNTER — TELEPHONE (OUTPATIENT)
Dept: OBSTETRICS AND GYNECOLOGY | Facility: CLINIC | Age: 52
End: 2022-06-09

## 2022-06-09 DIAGNOSIS — B37.9 YEAST INFECTION: Primary | ICD-10-CM

## 2022-06-09 RX ORDER — FLUCONAZOLE 150 MG/1
TABLET ORAL
Qty: 2 TABLET | Refills: 0 | Status: SHIPPED | OUTPATIENT
Start: 2022-06-09

## 2023-01-17 ENCOUNTER — TRANSCRIBE ORDERS (OUTPATIENT)
Dept: ADMINISTRATIVE | Facility: HOSPITAL | Age: 53
End: 2023-01-17
Payer: COMMERCIAL

## 2023-01-17 DIAGNOSIS — Z12.31 VISIT FOR SCREENING MAMMOGRAM: Primary | ICD-10-CM

## 2023-02-13 ENCOUNTER — HOSPITAL ENCOUNTER (OUTPATIENT)
Dept: MAMMOGRAPHY | Facility: HOSPITAL | Age: 53
Discharge: HOME | End: 2023-02-13
Admitting: OBSTETRICS & GYNECOLOGY
Payer: COMMERCIAL

## 2023-02-13 DIAGNOSIS — Z12.31 VISIT FOR SCREENING MAMMOGRAM: ICD-10-CM

## 2023-02-13 PROCEDURE — 77063 BREAST TOMOSYNTHESIS BI: CPT | Performed by: RADIOLOGY

## 2023-02-13 PROCEDURE — 77063 BREAST TOMOSYNTHESIS BI: CPT

## 2023-02-13 PROCEDURE — 77067 SCR MAMMO BI INCL CAD: CPT | Performed by: RADIOLOGY

## 2023-02-13 PROCEDURE — 77067 SCR MAMMO BI INCL CAD: CPT

## 2023-06-08 ENCOUNTER — OFFICE VISIT (OUTPATIENT)
Dept: OBSTETRICS AND GYNECOLOGY | Facility: CLINIC | Age: 53
End: 2023-06-08
Payer: COMMERCIAL

## 2023-06-08 VITALS
DIASTOLIC BLOOD PRESSURE: 80 MMHG | HEIGHT: 67 IN | SYSTOLIC BLOOD PRESSURE: 112 MMHG | WEIGHT: 278 LBS | BODY MASS INDEX: 43.63 KG/M2

## 2023-06-08 DIAGNOSIS — E66.01 MORBID OBESITY WITH BMI OF 45.0-49.9, ADULT: ICD-10-CM

## 2023-06-08 DIAGNOSIS — Z80.49 FAMILY HISTORY OF UTERINE CANCER: ICD-10-CM

## 2023-06-08 DIAGNOSIS — Z80.3 FAMILY HISTORY OF BREAST CANCER: ICD-10-CM

## 2023-06-08 DIAGNOSIS — Z01.419 ENCOUNTER FOR GYNECOLOGICAL EXAMINATION WITHOUT ABNORMAL FINDING: Primary | ICD-10-CM

## 2023-06-08 DIAGNOSIS — Z30.40 ENCOUNTER FOR REFILL OF PRESCRIPTION FOR CONTRACEPTION: ICD-10-CM

## 2023-06-08 DIAGNOSIS — Z91.89 AT HIGH RISK FOR BREAST CANCER: ICD-10-CM

## 2023-06-08 DIAGNOSIS — Z12.31 BREAST CANCER SCREENING BY MAMMOGRAM: ICD-10-CM

## 2023-06-08 DIAGNOSIS — N91.2 AMENORRHEA: ICD-10-CM

## 2023-06-08 RX ORDER — ACETAMINOPHEN AND CODEINE PHOSPHATE 120; 12 MG/5ML; MG/5ML
1 SOLUTION ORAL DAILY
Qty: 84 TABLET | Refills: 3 | Status: SHIPPED | OUTPATIENT
Start: 2023-06-08

## 2023-06-08 NOTE — PROGRESS NOTES
Gynecologic Annual Exam Note          GYN Annual Exam     Annual Exam        Subjective     HPI  Daisy Tierney is a 52 y.o. female, , who presents for annual well woman exam as a established patient . No LMP recorded (lmp unknown). (Menstrual status: Oral contraceptives)..  Patient reports problems with: none. Partner Status: Marital Status: . She is is sexually active. She has not had new partners.. STD testing recommendations have been explained to the patient and she does not desire STD testing. There were no changes to her medical or surgical history since her last visit.  Patient states she has not had a period in over a year. She does report PMS symptoms and some cramping about two times a month.    She is having intermittent vms      Additional OB/GYN History   Current contraception: contraceptive methods: micronor  Desires to: continue contraception    Last Pap : 21. Result: negative. HPV: negative.   Last Completed Pap Smear            PAP SMEAR (Every 3 Years) Next due on 3/25/2024      2021  SCANNED - PAP SMEAR    2020  Done - negative; last HPV regardless 10/11/2018                  History of abnormal Pap smear: no  Family history of uterine, colon, breast, or ovarian cancer: yes - mother had breast and uterine cancer, maternal aunt had uterine cancer  Performs monthly Self-Breast Exam: yes  Last mammogram: 23. Done at [facility].    Last Completed Mammogram            Ordered - MAMMOGRAM (Yearly) Ordered on 2023  Mammo Screening Digital Tomosynthesis Bilateral With CAD    2022  Mammo Screening Digital Tomosynthesis Bilateral With CAD    2021  Mammo Screening Digital Tomosynthesis Bilateral With CAD    2019  Mammo Screening Digital Tomosynthesis Bilateral With CAD    10/16/2018  Mammo screening digital tomosynthesis bilateral w CAD    Only the first 5 history entries have been loaded, but more history exists.                     History of abnormal mammogram: no    Colonoscopy: has had a colonoscopy 15 year(s) ago. Patient has an appointment at the end of the month.  Exercises Regularly: yes  Feelings of Anxiety or Depression: no  Tobacco Usage?: No       Current Outpatient Medications:     cetirizine (zyrTEC) 10 MG tablet, Take 1 tablet by mouth Daily., Disp: , Rfl: 2    Cholecalciferol (VITAMIN D-3) 5000 units tablet, Take 1 tablet by mouth Daily., Disp: 120 tablet, Rfl: 3    Chromium (CHROMIUM GTF) 200 MCG tablet, Take 1 tablet by mouth 2 (Two) Times a Day With Meals., Disp: , Rfl:     fenofibrate (TRICOR) 48 MG tablet, Take 1 tablet by mouth Daily., Disp: , Rfl:     ipratropium (ATROVENT) 0.03 % nasal spray, 1 spray 2 (Two) Times a Day., Disp: , Rfl:     lisinopril-hydrochlorothiazide (PRINZIDE,ZESTORETIC) 20-12.5 MG per tablet, Take 1 tablet by mouth Daily., Disp: , Rfl: 1    metFORMIN ER (GLUCOPHAGE-XR) 500 MG 24 hr tablet, Take 1 in the am and 2 in the pm, Disp: 90 tablet, Rfl: 2    Multiple Vitamins-Minerals (MULTIVITAMIN ADULTS) tablet, Take 1 tablet by mouth Daily., Disp: , Rfl:     norethindrone (MICRONOR) 0.35 MG tablet, Take 1 tablet by mouth Daily., Disp: 84 tablet, Rfl: 3    Omega-3 1000 MG capsule, Take 1,000 mg by mouth 2 (Two) Times a Day., Disp: , Rfl:     omeprazole (priLOSEC) 20 MG capsule, Take 1 capsule by mouth Every Morning Before Breakfast., Disp: , Rfl:     Semaglutide-Weight Management 2.4 MG/0.75ML solution auto-injector, Inject  under the skin into the appropriate area as directed., Disp: , Rfl:     phentermine (ADIPEX-P) 37.5 MG tablet, Take one tablet by mouth at 11 am & 1/2 at 3pm (Patient not taking: Reported on 2023), Disp: 45 tablet, Rfl: 0     Patient is requesting refills of micronor.    OB History          4    Para   2    Term   2       0    AB   2    Living   2         SAB   2    IAB   0    Ectopic   0    Molar   0    Multiple   0    Live Births   2                 Past Medical History:   Diagnosis Date    Allergic rhinitis     Asthma 2022    Diabetes mellitus 2023    GERD (gastroesophageal reflux disease)     Gestational diabetes 2005    History of gestational diabetes     History of pregnancy induced hypertension     History of  labor     Hypertension     Irritable bowel syndrome         Past Surgical History:   Procedure Laterality Date    NO PAST SURGERIES      WISDOM TOOTH EXTRACTION         Health Maintenance   Topic Date Due    Pneumococcal Vaccine 0-64 (1 - PCV) Never done    COLORECTAL CANCER SCREENING  2018    HEPATITIS C SCREENING  Never done    ANNUAL PHYSICAL  Never done    DIABETIC FOOT EXAM  Never done    DIABETIC EYE EXAM  Never done    ZOSTER VACCINE (1 of 2) Never done    HEMOGLOBIN A1C  2023    Annual Gynecologic Pelvic and Breast Exam  2023    INFLUENZA VACCINE  2023    LIPID PANEL  2023    URINE MICROALBUMIN  2023    MAMMOGRAM  2024    PAP SMEAR  2024    TDAP/TD VACCINES (3 - Td or Tdap) 2032    COVID-19 Vaccine  Completed       The additional following portions of the patient's history were reviewed and updated as appropriate: allergies, current medications, past family history, past medical history, past social history, past surgical history, and problem list.    Review of Systems   Constitutional: Negative.    HENT: Negative.     Eyes: Negative.    Respiratory: Negative.     Cardiovascular: Negative.    Gastrointestinal: Negative.    Endocrine: Negative.    Genitourinary: Negative.    Musculoskeletal: Negative.    Skin: Negative.    Allergic/Immunologic: Negative.    Neurological: Negative.    Hematological: Negative.    Psychiatric/Behavioral: Negative.         I have reviewed and agree with the HPI, ROS, and historical information as entered above.         Objective   /80 (BP Location: Right arm, Patient Position: Sitting, Cuff Size: Adult)   Ht 170.2  "cm (67\")   Wt 126 kg (278 lb)   LMP  (LMP Unknown) Comment: haven't had a period since last year  Breastfeeding No   BMI 43.54 kg/m²     Physical Exam  Vitals and nursing note reviewed. Exam conducted with a chaperone present.   Constitutional:       General: She is awake.   HENT:      Head: Normocephalic and atraumatic.   Neck:      Thyroid: No thyroid mass, thyromegaly or thyroid tenderness.   Cardiovascular:      Rate and Rhythm: Normal rate.      Heart sounds: No murmur heard.    No friction rub. No gallop.   Pulmonary:      Effort: Pulmonary effort is normal.      Breath sounds: Normal breath sounds. No stridor. No wheezing, rhonchi or rales.   Chest:      Chest wall: No mass or tenderness.   Breasts:     Right: Normal. No bleeding, inverted nipple, mass, nipple discharge, skin change or tenderness.      Left: Normal. No bleeding, inverted nipple, mass, nipple discharge, skin change or tenderness.   Abdominal:      Palpations: Abdomen is soft.      Tenderness: There is no guarding or rebound.      Hernia: There is no hernia in the left inguinal area or right inguinal area.   Genitourinary:     General: Normal vulva.      Pubic Area: No rash.       Labia:         Right: No rash, tenderness, lesion or injury.         Left: No rash, tenderness, lesion or injury.       Urethra: No prolapse, urethral swelling or urethral lesion.      Vagina: No foreign body. No vaginal discharge, erythema, tenderness, bleeding or lesions.      Cervix: No cervical motion tenderness, discharge, friability, lesion, erythema or cervical bleeding.      Uterus: Normal. Not deviated, not enlarged, not fixed and not tender.       Adnexa: Right adnexa normal and left adnexa normal.        Right: No mass, tenderness or fullness.          Left: No mass, tenderness or fullness.        Rectum: No external hemorrhoid.   Musculoskeletal:      Cervical back: Normal range of motion.   Lymphadenopathy:      Upper Body:      Right upper body: No " supraclavicular or axillary adenopathy.      Left upper body: No supraclavicular or axillary adenopathy.   Skin:     General: Skin is warm.   Neurological:      Mental Status: She is alert and oriented to person, place, and time.   Psychiatric:         Mood and Affect: Mood and affect normal.         Speech: Speech normal.          Assessment and Plan    Problem List Items Addressed This Visit          Endocrine and Metabolic    Morbid obesity with BMI of 45.0-49.9, adult       Family History    Family history of breast cancer    Overview     Mother diagnosed at age 67    Pt's Jackie Risk 18%    Getting annual mammogram and Breast MRI         Relevant Orders    MRI Breast Bilateral Screening With & Without Contrast    Family history of uterine cancer    Overview     Mother diagnosed in her 70's    Maternal Aunt x 2---one in her 50's, one in her 70's           Relevant Orders    US Non-ob Transvaginal     Other Visit Diagnoses       Encounter for gynecological examination without abnormal finding    -  Primary    Amenorrhea        Relevant Orders    Luteinizing Hormone    Follicle Stimulating Hormone    Estradiol    Breast cancer screening by mammogram        Relevant Orders    Mammo Screening Digital Tomosynthesis Bilateral With CAD    At high risk for breast cancer        Relevant Orders    MRI Breast Bilateral Screening With & Without Contrast    Encounter for refill of prescription for contraception        Relevant Medications    norethindrone (MICRONOR) 0.35 MG tablet            GYN annual well woman exam.   Pap guidelines reviewed.  Reviewed monthly self breast exams.  Instructed to call with lumps, pain, or breast discharge.    Ordered Mammogram today  Symptoms of menopausal transition reviewed with patient.   Pt with strong family h/o uterine ca as well as breast.  Will return for u/s and to discuss possible genetic testing.   Amenorrhea, on micronor, having some VMS, will check labs today.    Return for  Fifi and u/s.     Kendra Pinto MD  06/08/2023

## 2023-08-10 ENCOUNTER — OFFICE VISIT (OUTPATIENT)
Dept: OBSTETRICS AND GYNECOLOGY | Facility: CLINIC | Age: 53
End: 2023-08-10
Payer: COMMERCIAL

## 2023-08-10 ENCOUNTER — HOSPITAL ENCOUNTER (OUTPATIENT)
Dept: ULTRASOUND IMAGING | Facility: HOSPITAL | Age: 53
Discharge: HOME | End: 2023-08-10
Admitting: OBSTETRICS & GYNECOLOGY
Payer: COMMERCIAL

## 2023-08-10 VITALS
WEIGHT: 281 LBS | BODY MASS INDEX: 44.1 KG/M2 | SYSTOLIC BLOOD PRESSURE: 118 MMHG | HEIGHT: 67 IN | DIASTOLIC BLOOD PRESSURE: 80 MMHG

## 2023-08-10 DIAGNOSIS — Z80.3 FAMILY HISTORY OF BREAST CANCER: ICD-10-CM

## 2023-08-10 DIAGNOSIS — Z80.49 FAMILY HISTORY OF UTERINE CANCER: ICD-10-CM

## 2023-08-10 DIAGNOSIS — N91.2 AMENORRHEA: Primary | ICD-10-CM

## 2023-08-10 PROCEDURE — 76830 TRANSVAGINAL US NON-OB: CPT

## 2023-08-10 NOTE — PROGRESS NOTES
Chief Complaint   Patient presents with    Follow-up       Subjective   HPI  Daisy Tierney is a 52 y.o. female, . Her last LMP was No LMP recorded. (Menstrual status: Oral contraceptives). Pt is here for follow up of family hx of uterine cancer and genetic counseling/testing. She did have U/S in [address] building. See imaging.     Pt has strong hx of uterine cancer. Mother diagnosed in her 70's. Maternal Aunt x 2---one in her 50's, one in her 70's. She is considering genetic testing today.         Additional OB/GYN History     Last Pap :   Last Completed Pap Smear            PAP SMEAR (Every 3 Years) Next due on 3/25/2024      2021  SCANNED - PAP SMEAR    2020  Done - negative; last HPV regardless 10/11/2018                    Last mammogram:   Last Completed Mammogram            Ordered - MAMMOGRAM (Yearly) Ordered on 2023  Mammo Screening Digital Tomosynthesis Bilateral With CAD    2022  Mammo Screening Digital Tomosynthesis Bilateral With CAD    2021  Mammo Screening Digital Tomosynthesis Bilateral With CAD    2019  Mammo Screening Digital Tomosynthesis Bilateral With CAD    10/16/2018  Mammo screening digital tomosynthesis bilateral w CAD    Only the first 5 history entries have been loaded, but more history exists.                    Tobacco Usage?: No   OB History          4    Para   2    Term   2       0    AB   2    Living   2         SAB   2    IAB   0    Ectopic   0    Molar   0    Multiple   0    Live Births   2                  Current Outpatient Medications:     cetirizine (zyrTEC) 10 MG tablet, Take 1 tablet by mouth Daily., Disp: , Rfl: 2    Cholecalciferol (VITAMIN D-3) 5000 units tablet, Take 1 tablet by mouth Daily., Disp: 120 tablet, Rfl: 3    Chromium (CHROMIUM GTF) 200 MCG tablet, Take 1 tablet by mouth 2 (Two) Times a Day With Meals., Disp: , Rfl:     fenofibrate (TRICOR) 48 MG tablet, Take 1 tablet by  mouth Daily., Disp: , Rfl:     ipratropium (ATROVENT) 0.03 % nasal spray, 1 spray 2 (Two) Times a Day., Disp: , Rfl:     lisinopril-hydrochlorothiazide (PRINZIDE,ZESTORETIC) 20-12.5 MG per tablet, Take 1 tablet by mouth Daily., Disp: , Rfl: 1    metFORMIN ER (GLUCOPHAGE-XR) 500 MG 24 hr tablet, Take 1 in the am and 2 in the pm, Disp: 90 tablet, Rfl: 2    Multiple Vitamins-Minerals (MULTIVITAMIN ADULTS) tablet, Take 1 tablet by mouth Daily., Disp: , Rfl:     norethindrone (MICRONOR) 0.35 MG tablet, Take 1 tablet by mouth Daily., Disp: 84 tablet, Rfl: 3    Omega-3 1000 MG capsule, Take 1,000 mg by mouth 2 (Two) Times a Day., Disp: , Rfl:     omeprazole (priLOSEC) 20 MG capsule, Take 1 capsule by mouth Every Morning Before Breakfast., Disp: , Rfl:     phentermine (ADIPEX-P) 37.5 MG tablet, Take one tablet by mouth at 11 am & 1/2 at 3pm (Patient not taking: Reported on 2023), Disp: 45 tablet, Rfl: 0    Semaglutide-Weight Management 2.4 MG/0.75ML solution auto-injector, Inject  under the skin into the appropriate area as directed., Disp: , Rfl:      Past Medical History:   Diagnosis Date    Allergic rhinitis     Asthma 2022    Diabetes mellitus 2023    GERD (gastroesophageal reflux disease)     Gestational diabetes 2005    History of gestational diabetes     History of pregnancy induced hypertension     History of  labor     Hypertension     Irritable bowel syndrome         Past Surgical History:   Procedure Laterality Date    NO PAST SURGERIES      WISDOM TOOTH EXTRACTION         The additional following portions of the patient's history were reviewed and updated as appropriate: allergies, current medications, past family history, past medical history, past social history, past surgical history, and problem list.    Review of Systems   Constitutional: Negative.    HENT: Negative.     Eyes: Negative.    Respiratory: Negative.     Cardiovascular: Negative.    Gastrointestinal:  "Negative.    Endocrine: Negative.    Genitourinary: Negative.    Musculoskeletal: Negative.    Skin: Negative.    Allergic/Immunologic: Negative.    Neurological: Negative.    Hematological: Negative.    Psychiatric/Behavioral: Negative.       I have reviewed and agree with the HPI, ROS, and historical information as entered above.     Objective   /80   Ht 170.2 cm (67\")   Wt 127 kg (281 lb)   BMI 44.01 kg/mý     Physical Exam  Vitals and nursing note reviewed.   Constitutional:       General: She is not in acute distress.     Appearance: Normal appearance.   HENT:      Head: Normocephalic and atraumatic.   Pulmonary:      Effort: Pulmonary effort is normal. No accessory muscle usage or respiratory distress.   Neurological:      Mental Status: She is alert and oriented to person, place, and time.   Psychiatric:         Mood and Affect: Mood and affect normal.         Speech: Speech normal.       Assessment & Plan     Assessment     Problem List Items Addressed This Visit          Family History    Family history of breast cancer    Overview     Mother diagnosed at age 67    Pt's Jackie Risk 18%    Getting annual mammogram and Breast MRI         Family history of uterine cancer    Overview     Mother diagnosed in her 70's    Maternal Aunt x 2---one in her 50's, one in her 70's            Other Visit Diagnoses       Amenorrhea    -  Primary    Relevant Orders    Luteinizing Hormone (Completed)    Follicle Stimulating Hormone (Completed)    Estradiol (Completed)    HCG, B-subunit, Quantitative (Completed)                Plan     Return for Annual physical with u/s.  Patient underwent transvaginal ultrasound secondary to strong family history of uterine cancer.  She is currently on progestin only pills and we reviewed potential treatment option of Mirena IUD.  At this time we plan annual ultrasound.  Warning signs for hyperplasia and endometrial cancer were discussed with the patient.  The patient was counseled " on genetic testing.  We discussed that multiple genes are evaluated and that if found to be positive for BRCA1 or BRCA2, the recommendations would be to proceed with prophylactic bilateral mastectomy as well as bilateral salpingo-oophorectomy and possible hysterectomy at the appropriate age.  We discussed that if she is found to be at high risk for breast cancer, it would be recommended to proceed with annual mammogram, annual breast MRI and every 6 month clinical breast exams.  We also reviewed implications for increased screening for hysterectomy if found to have genetic predisposition to uterine cancer.  We also discussed potential increased colorectal cancer screening if she was diagnosed with Villagomez syndrome.  At this time patient declines to proceed with genetic testing, but will consider it for the future.      Kendra Pinto MD  08/10/2023

## 2023-08-11 LAB
ESTRADIOL SERPL-MCNC: 11.8 PG/ML
FSH SERPL-ACNC: 41.6 MIU/ML
HCG INTACT+B SERPL-ACNC: <1 MIU/ML
LH SERPL-ACNC: 23.7 MIU/ML

## 2023-08-29 ENCOUNTER — HOSPITAL ENCOUNTER (OUTPATIENT)
Dept: MRI IMAGING | Facility: HOSPITAL | Age: 53
Discharge: HOME | End: 2023-08-29
Admitting: OBSTETRICS & GYNECOLOGY
Payer: COMMERCIAL

## 2023-08-29 DIAGNOSIS — Z91.89 AT HIGH RISK FOR BREAST CANCER: ICD-10-CM

## 2023-08-29 DIAGNOSIS — Z80.3 FAMILY HISTORY OF BREAST CANCER: ICD-10-CM

## 2023-08-29 LAB — CREAT BLDA-MCNC: 0.5 MG/DL (ref 0.6–1.3)

## 2023-08-29 PROCEDURE — 82565 ASSAY OF CREATININE: CPT

## 2023-08-29 PROCEDURE — 77049 MRI BREAST C-+ W/CAD BI: CPT

## 2023-08-29 PROCEDURE — A9577 INJ MULTIHANCE: HCPCS | Performed by: OBSTETRICS & GYNECOLOGY

## 2023-08-29 PROCEDURE — 0 GADOBENATE DIMEGLUMINE 529 MG/ML SOLUTION: Performed by: OBSTETRICS & GYNECOLOGY

## 2023-08-29 RX ADMIN — GADOBENATE DIMEGLUMINE 19 ML: 529 INJECTION, SOLUTION INTRAVENOUS at 09:14

## 2024-03-14 ENCOUNTER — HOSPITAL ENCOUNTER (OUTPATIENT)
Dept: MAMMOGRAPHY | Facility: HOSPITAL | Age: 54
Discharge: HOME | End: 2024-03-14
Admitting: OBSTETRICS & GYNECOLOGY
Payer: COMMERCIAL

## 2024-03-14 DIAGNOSIS — Z12.31 BREAST CANCER SCREENING BY MAMMOGRAM: ICD-10-CM

## 2024-03-14 PROCEDURE — 77067 SCR MAMMO BI INCL CAD: CPT

## 2024-03-14 PROCEDURE — 77063 BREAST TOMOSYNTHESIS BI: CPT

## 2024-06-19 DIAGNOSIS — Z80.49 FAMILY HISTORY OF UTERINE CANCER: Primary | ICD-10-CM

## 2024-06-20 ENCOUNTER — OFFICE VISIT (OUTPATIENT)
Dept: OBSTETRICS AND GYNECOLOGY | Facility: CLINIC | Age: 54
End: 2024-06-20
Payer: COMMERCIAL

## 2024-06-20 VITALS
WEIGHT: 275 LBS | HEIGHT: 67 IN | BODY MASS INDEX: 43.16 KG/M2 | DIASTOLIC BLOOD PRESSURE: 90 MMHG | SYSTOLIC BLOOD PRESSURE: 130 MMHG

## 2024-06-20 DIAGNOSIS — Z12.31 BREAST CANCER SCREENING BY MAMMOGRAM: ICD-10-CM

## 2024-06-20 DIAGNOSIS — Z80.3 FAMILY HISTORY OF BREAST CANCER: ICD-10-CM

## 2024-06-20 DIAGNOSIS — Z80.49 FAMILY HISTORY OF UTERINE CANCER: ICD-10-CM

## 2024-06-20 DIAGNOSIS — Z01.419 ENCOUNTER FOR GYNECOLOGICAL EXAMINATION WITHOUT ABNORMAL FINDING: Primary | ICD-10-CM

## 2024-06-20 DIAGNOSIS — Z12.39 BREAST CANCER SCREENING, HIGH RISK PATIENT: ICD-10-CM

## 2024-06-20 NOTE — PROGRESS NOTES
Gynecologic Annual Exam Note        CC - Here for Annual Exam.     Subjective     HPI  Daisy Tierney is a 53 y.o. female, , who presents for annual well woman exam.  She is postmenopausal.  Patient reports problems with: none.  Partner Status: Marital Status: .  New Partners since last visit: no.   She Denies vasomotor symptoms.  She denies  issues with urinary incontinence.     The patient does not have any complaints today.  Mild VMS.    Final LMP 2022    She has concerns about domestic violence: no.        Additional OB/GYN History     History of abnormal Pap smear: no  Family history of uterine, colon, breast, or ovarian cancer: yes - mother breast & uterine cancer, M Aunt x2 uterine cancer  Performs monthly Self-Breast Exam: yes  Exercises Regularly: no  Feelings of Anxiety or Depression: no    Last Pap : 3/25/21 negative -HPV  Last Completed Pap Smear       This patient has no relevant Health Maintenance data.          Last mammogram: 3/14/24 negative  Last Completed Mammogram            Ordered - MAMMOGRAM (Yearly) Ordered on 2024  Mammo Screening Digital Tomosynthesis Bilateral With CAD    2023  Mammo Screening Digital Tomosynthesis Bilateral With CAD    2022  Mammo Screening Digital Tomosynthesis Bilateral With CAD    2021  Mammo Screening Digital Tomosynthesis Bilateral With CAD    2019  Mammo Screening Digital Tomosynthesis Bilateral With CAD    Only the first 5 history entries have been loaded, but more history exists.                  Last colonoscopy: 2023 negative RTO 10yrs  Last Completed Colonoscopy       This patient has no relevant Health Maintenance data.          Last DEXA: never    Tobacco Usage?: No   OB History          4    Para   2    Term   2       0    AB   2    Living   2         SAB   2    IAB   0    Ectopic   0    Molar   0    Multiple   0    Live Births   2                Health Maintenance  "  Topic Date Due    Pneumococcal Vaccine 0-64 (1 of 2 - PCV) Never done    DIABETIC EYE EXAM  Never done    Hepatitis B (1 of 3 - 19+ 3-dose series) Never done    COLORECTAL CANCER SCREENING  2018    HEPATITIS C SCREENING  Never done    ANNUAL PHYSICAL  Never done    DIABETIC FOOT EXAM  Never done    BMI FOLLOWUP  2021    PAP SMEAR  2024    Annual Gynecologic Pelvic and Breast Exam  2024    INFLUENZA VACCINE  2024    LIPID PANEL  2024    URINE MICROALBUMIN  2024    HEMOGLOBIN A1C  2024    MAMMOGRAM  2025    TDAP/TD VACCINES (3 - Td or Tdap) 2032    COVID-19 Vaccine  Completed    ZOSTER VACCINE  Completed       The additional following portions of the patient's history were reviewed and updated as appropriate: allergies, current medications, past family history, past medical history, past social history, past surgical history, and problem list.    Past Medical History:   Diagnosis Date    Allergic rhinitis     Asthma 2022    Diabetes mellitus 2023    GERD (gastroesophageal reflux disease)     Gestational diabetes 2005    History of gestational diabetes     History of pregnancy induced hypertension     History of  labor     Hypertension     Irritable bowel syndrome         Past Surgical History:   Procedure Laterality Date    NO PAST SURGERIES      WISDOM TOOTH EXTRACTION          Review of Systems   All other systems reviewed and are negative.      I have reviewed and agree with the HPI, ROS, and historical information as entered above. Kendra Pinto MD    Objective   /90   Ht 170.2 cm (67\")   Wt 125 kg (275 lb)   LMP  (LMP Unknown) Comment: haven't had a period since last year  BMI 43.07 kg/m²     Physical Exam  Vitals and nursing note reviewed. Exam conducted with a chaperone present.   Constitutional:       General: She is awake.   HENT:      Head: Normocephalic and atraumatic.   Neck:      Thyroid: No thyroid " mass, thyromegaly or thyroid tenderness.   Cardiovascular:      Rate and Rhythm: Normal rate.      Heart sounds: No murmur heard.     No friction rub. No gallop.   Pulmonary:      Effort: Pulmonary effort is normal.      Breath sounds: Normal breath sounds. No stridor. No wheezing, rhonchi or rales.   Chest:      Chest wall: No mass or tenderness.   Breasts:     Right: Normal. No bleeding, inverted nipple, mass, nipple discharge, skin change or tenderness.      Left: Normal. No bleeding, inverted nipple, mass, nipple discharge, skin change or tenderness.   Abdominal:      Palpations: Abdomen is soft.      Tenderness: There is no guarding or rebound.      Hernia: There is no hernia in the left inguinal area or right inguinal area.   Genitourinary:     General: Normal vulva.      Pubic Area: No rash.       Labia:         Right: No rash, tenderness, lesion or injury.         Left: No rash, tenderness, lesion or injury.       Urethra: No prolapse, urethral swelling or urethral lesion.      Vagina: No foreign body. No vaginal discharge, erythema, tenderness, bleeding or lesions.      Cervix: No cervical motion tenderness, discharge, friability, lesion, erythema or cervical bleeding.      Uterus: Normal. Not deviated, not enlarged, not fixed and not tender.       Adnexa: Right adnexa normal and left adnexa normal.        Right: No mass, tenderness or fullness.          Left: No mass, tenderness or fullness.        Rectum: No external hemorrhoid.   Musculoskeletal:      Cervical back: Normal range of motion.   Lymphadenopathy:      Upper Body:      Right upper body: No supraclavicular or axillary adenopathy.      Left upper body: No supraclavicular or axillary adenopathy.   Skin:     General: Skin is warm.   Neurological:      Mental Status: She is alert and oriented to person, place, and time.   Psychiatric:         Mood and Affect: Mood and affect normal.         Speech: Speech normal.         Assessment & Plan      Assessment     Problem List Items Addressed This Visit       Family history of breast cancer    Overview     Mother diagnosed at age 67    Pt's Jackie Risk 18%    Getting annual mammogram and Breast MRI         Relevant Orders    MRI Breast Bilateral Screening With & Without Contrast    Ambulatory Referral to Genetic Counseling/Testing    Family history of uterine cancer    Overview     Mother diagnosed in her 70's    Maternal Aunt x 2---one in her 50's, one in her 70's           Relevant Orders    US Non-ob Transvaginal    Ambulatory Referral to Genetic Counseling/Testing     Other Visit Diagnoses       Encounter for gynecological examination without abnormal finding    -  Primary    Relevant Orders    LIQUID-BASED PAP SMEAR WITH HPV GENOTYPING REGARDLESS OF INTERPRETATION ([facility]UDIA,[facility],MAD)    Breast cancer screening by mammogram        Relevant Orders    Mammo Screening Digital Tomosynthesis Bilateral With CAD    Breast cancer screening, high risk patient        Relevant Orders    MRI Breast Bilateral Screening With & Without Contrast            Plan     Reviewed monthly self breast exams.  Instructed to call with lumps, pain, or breast discharge.  Yearly mammograms ordered.  Ordered mammogram today.  Due for Breast MRI in 9/2024  Referral made to discuss genetic testing options.  PT menopausal, counseled to call and come in for evaluation if any vb.  Return in about 6 months (around 12/20/2024) for Breast Check.  Plan repeat u/s with annual in 1 year      Kendra Pinto MD  06/20/2024

## 2024-06-27 LAB — REF LAB TEST METHOD: NORMAL

## 2024-08-30 ENCOUNTER — HOSPITAL ENCOUNTER (OUTPATIENT)
Dept: MRI IMAGING | Facility: HOSPITAL | Age: 54
Discharge: HOME | End: 2024-08-30
Admitting: OBSTETRICS & GYNECOLOGY
Payer: COMMERCIAL

## 2024-08-30 DIAGNOSIS — Z12.39 BREAST CANCER SCREENING, HIGH RISK PATIENT: ICD-10-CM

## 2024-08-30 DIAGNOSIS — Z80.3 FAMILY HISTORY OF BREAST CANCER: ICD-10-CM

## 2024-08-30 LAB — CREAT BLDA-MCNC: 0.7 MG/DL (ref 0.6–1.3)

## 2024-08-30 PROCEDURE — 77049 MRI BREAST C-+ W/CAD BI: CPT

## 2024-08-30 PROCEDURE — A9577 INJ MULTIHANCE: HCPCS | Performed by: OBSTETRICS & GYNECOLOGY

## 2024-08-30 PROCEDURE — 82565 ASSAY OF CREATININE: CPT

## 2024-08-30 PROCEDURE — 0 GADOBENATE DIMEGLUMINE 529 MG/ML SOLUTION: Performed by: OBSTETRICS & GYNECOLOGY

## 2024-08-30 RX ADMIN — GADOBENATE DIMEGLUMINE 19 ML: 529 INJECTION, SOLUTION INTRAVENOUS at 10:55

## 2024-10-25 ENCOUNTER — TELEPHONE (OUTPATIENT)
Dept: GENETICS | Facility: HOSPITAL | Age: 54
End: 2024-10-25
Payer: COMMERCIAL

## 2024-10-30 ENCOUNTER — APPOINTMENT (OUTPATIENT)
Facility: HOSPITAL | Age: 54
End: 2024-10-30
Payer: COMMERCIAL

## 2024-11-05 ENCOUNTER — CLINICAL SUPPORT (OUTPATIENT)
Facility: HOSPITAL | Age: 54
End: 2024-11-05
Payer: COMMERCIAL

## 2024-11-05 ENCOUNTER — LAB (OUTPATIENT)
Facility: HOSPITAL | Age: 54
End: 2024-11-05
Payer: COMMERCIAL

## 2024-11-05 DIAGNOSIS — Z13.79 GENETIC TESTING: Primary | ICD-10-CM

## 2024-11-05 DIAGNOSIS — Z80.49 FAMILY HISTORY OF MALIGNANT NEOPLASM OF UTERUS: ICD-10-CM

## 2024-11-05 DIAGNOSIS — Z80.0 FAMILY HISTORY OF COLON CANCER: ICD-10-CM

## 2024-11-05 DIAGNOSIS — Z80.3 FAMILY HISTORY OF MALIGNANT NEOPLASM OF BREAST: ICD-10-CM

## 2024-11-05 PROCEDURE — 96040: CPT | Performed by: GENETIC COUNSELOR, MS

## 2024-11-05 NOTE — PROGRESS NOTES
Daisy Tierney, a 53 y.o. female, was referred for genetic counseling due to a family history of cancer. She was 15 years old at menarche and had her first child at 31. Her current cancer screening includes annual clinical breast exam, annual mammogram, and annual breast MRI. Her most recent colonoscopy was last year and she doesn't report a personal history of colon polyps.  Ms. Tierney was post-menopausal around age 52 and has not taken HRT.  Ms. Tierney retains her uterus and ovaries. She was interested in discussing her risk for a hereditary cancer syndrome. Ms. Tierney was interested in pursuing a multigene panel, and therefore the CancerNext-Expanded panel was ordered through [facility] which analyzes 71 genes associated with an increased cancer risk.     FAMILY HISTORY:  Mother:   Breast cancer, 67; Uterine cancer, 67  Mat. Aunt:   Uterine cancer, 55  Mat. Aunt:   Uterine cancer, 71  Pat. 1st cousin:  Colon cancer, 50s    We do not have medical records confirming the diagnoses in Ms. Tierney's family.    RISK ASSESSMENT: Ms. Tierney's  family history of uterine cancer led to concern regarding a hereditary cancer syndrome.  She meets NCCN guidelines criteria for Villagomez syndrome testing based on her family history of three first- and second-degree relatives with a history of uterine cancer. The standard approach to genetic testing is through a multigene panel. If genetic testing is negative, Ms. Tierney's management should be guided by family history.     GENETIC COUNSELING (30 minutes):  We reviewed the family history information in detail.  Cases of cancer follow three general patterns: sporadic, familial, and hereditary.  While most cancer is sporadic, some cases appear to occur in family clusters.  These cases are said to be familial and account for 10-20% of certain cancer cases.  Familial cases may be due to a combination of shared genes and environmental factors among family members.   In even fewer families (~10%), the cancer is said to be inherited, and the genes responsible for the cancer are known.      Family histories typical of hereditary cancer syndromes usually include multiple first- and second-degree relatives diagnosed with cancer types that define a syndrome.  These cases tend to be diagnosed at younger-than-expected ages and can be bilateral or multifocal.  The cancer in these families follows an autosomal dominant inheritance pattern, which indicates the likely presence of a mutation in a cancer susceptibility gene.  Children and siblings of an individual believed to carry this mutation have a 50% chance of inheriting that mutation, thereby inheriting the increased risk to develop cancer.  These mutations can be passed down from the maternal or the paternal lineage.    The most common form of hereditary uterine/endometrial cancer is Villagomez syndrome.  Villagomez syndrome is caused by mutations in mismatch repair genes, (MLH1, MSH2, MSH6, PMS2, and EPCAM).  The lifetime risk for colon cancer for individuals with Villagomez syndrome can be as high as 60% if there is no intervention (i.e. removal of polyps detected on colonoscopy).  Other risks include gastric, urinary tract, small bowel, biliary tract, pancreas, and brain.  Females with Villagomez syndrome also have an elevated risk for endometrial and ovarian cancer.  We also discussed the BRCA1/2 genes given the family history of breast cancer.     The standard approach to genetic testing is via a multigene panel.  Genes included on these panels have varying degrees of risk associated, and management and screening guidelines vary based on the specific gene.  Hereditary cancer syndromes can demonstrate incomplete penetrance and variable expression within families. There are genes that are evaluated that have been more recently described, and there may be less data regarding the risks and therefore may not have established management guidelines at  this time. We discussed the possibility of results that are unexpected based on family history. Based on Ms. Tierney's family history and her desire to get more information regarding her personal risks and risks for her family, she opted to pursue testing through a panel evaluating several other genes known to increase the risk for cancer.     GENETIC TESTING:  The risks, benefits and limitations of genetic testing and implications for clinical management following testing were reviewed. DNA test results can influence decisions regarding screening and prevention.  Genetic testing can have significant psychological implications for both individuals and families. Also discussed was the possibility of employment and insurance discrimination based on genetic test results and the federal and states laws that are in place to prevent this (VINNIE), as well as the limitations of these laws.         We discussed multigene panel testing, which would involve testing several genes associated with an increased cancer risk. The benefits and limitations of genetic testing were discussed and Ms. Tierney decided to pursue testing of the genes via the panel. The implications of a positive or negative test result were discussed.  We also discussed the importance of testing on an affected relative.  In cases where an affected relative is not available for testing or not willing to pursue testing, it is appropriate to offer testing to an unaffected individual. We discussed the possibility that, in some cases, genetic test results may be ambiguous due to the identification of a genetic variant of uncertain significance (VUS). These variants may or may not be associated with an increased cancer risk. With multigene panel testing, it is not uncommon for a VUS to be identified.  If a VUS is identified, testing family members is not recommended and screening recommendations are made based on the family history.  The laboratories that  perform genetic testing work to reclassify the VUS and send out an amended report if and when a VUS is reclassified.  The majority of variant findings are ultimately reclassified to a negative result. Given her family history, a negative test result does not eliminate all cancer risk, although the risk would not be as high as it would with positive genetic testing.     PLAN:  Genetic testing via the CancerNext-Expanded panel through [facility] was ordered. A blood sample was collected today 11/5/2024. Results are expected in 2-3 weeks and we will contact Ms. Tierney  with her results once they are received. She can contact us at [phone] with any questions in the meantime.       Joselin Jackson MS, [facility] [place], [facility]  Licensed Certified Genetic Counselor

## 2024-12-10 ENCOUNTER — DOCUMENTATION (OUTPATIENT)
Dept: GENETICS | Facility: HOSPITAL | Age: 54
End: 2024-12-10
Payer: COMMERCIAL

## 2024-12-10 NOTE — PROGRESS NOTES
Daisy Tierney, a 53 y.o. female, was referred for genetic counseling due to a family history of cancer. She was 15 years old at menarche and had her first child at 31. Her current cancer screening includes annual clinical breast exam, annual mammogram, and annual breast MRI. Her most recent colonoscopy was last year and she doesn't report a personal history of colon polyps.  Ms. Tierney was post-menopausal around age 52 and has not taken HRT.  Ms. Tierney retains her uterus and ovaries. She was interested in discussing her risk for a hereditary cancer syndrome. Ms. Tierney was interested in pursuing a multigene panel, and therefore the CancerNext-Expanded panel was ordered through [employer] which analyzes 71 genes associated with an increased cancer risk. The genes on this panel include AIP, ALK, APC, JOSAFAT, AXIN2, BAP1, BARD1, BMPR1A, BRCA1, BRCA2, BRIP1, CDC73, CDH1, CDK4, CDKN1B, CDKN2A, CHEK2, CTNNA1, DICER1, EGFR, EGLN1, EPCAM, FH, FLCN, GREM1, HOXB13, KIF1B, KIT, LZTR1, MAX, MEN1, MET, MITF, MLH1, MSH2, MSH3, MSH6, MUTYH, NF1, NF2, NTHL1, PALB2, PDGFRA, PHOX2B, PMS2, POLD1, POLE, POT1, DJRTE2I, PTCH1, PTEN, RAD51C, RAD51D, RB1, RET, SDHA, SDHAF2, SDHB, SDHC, SDHD, SMAD4, SMARCA4, SMARCB1, SMARCE1, STK11, SUFU, HKON961, TP53, TSC1, TSC2, and VHL. 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  These results were discussed with Ms. Tierney by telephone on 12/10/2024      FAMILY HISTORY:  Mother:                                    Breast cancer, 67; Uterine cancer, 67  Mat. Aunt:                                Uterine cancer, 55  Mat. Aunt:                                Uterine cancer, 71  Pat. 1st cousin:                       Colon cancer, 50s     We do not have medical records confirming the diagnoses in Ms. Tierney's family.     RISK ASSESSMENT: Ms. Tierney's  family history of uterine cancer led to  concern regarding a hereditary cancer syndrome.  She meets NCCN guidelines criteria for Villagomez syndrome testing based on her family history of three first- and second-degree relatives with a history of uterine cancer. The standard approach to genetic testing is through a multigene panel. If genetic testing is negative, Ms. Larsons management should be guided by family history.     Because genetic testing was negative, Ms. Ford management should be guided by a family history-based risk assessment.  Tyrer-Personaliszick, version 8 is able to take into account personal factors and family history when calculating risk for breast cancer. Computer modeling estimates that Ms. Ford lifetime personal risk for developing breast cancer is 16% (Tyrer-Cuzick, v8), in comparison to the general population risk of 12.5%. Per NCCN guidelines, a lifetime risk of 20% or greater is considered to be “high risk” where increased screening is warranted; Ms. Ford risk does not fall into that category. This risk assessment is based on the family history information provided at the time of the appointment and could change in the future should new information be obtained.      GENETIC COUNSELING:  We reviewed the family history information in detail.  Cases of cancer follow three general patterns: sporadic, familial, and hereditary.  While most cancer is sporadic, some cases appear to occur in family clusters.  These cases are said to be familial and account for 10-20% of certain cancer cases.  Familial cases may be due to a combination of shared genes and environmental factors among family members.  In even fewer families (~10%), the cancer is said to be inherited, and the genes responsible for the cancer are known.       Family histories typical of hereditary cancer syndromes usually include multiple first- and second-degree relatives diagnosed with cancer types that define a syndrome.  These cases tend to be diagnosed at  younger-than-expected ages and can be bilateral or multifocal.  The cancer in these families follows an autosomal dominant inheritance pattern, which indicates the likely presence of a mutation in a cancer susceptibility gene.  Children and siblings of an individual believed to carry this mutation have a 50% chance of inheriting that mutation, thereby inheriting the increased risk to develop cancer.  These mutations can be passed down from the maternal or the paternal lineage.     The most common form of hereditary uterine/endometrial cancer is Villagomez syndrome.  Villagomez syndrome is caused by mutations in mismatch repair genes, (MLH1, MSH2, MSH6, PMS2, and EPCAM).  The lifetime risk for colon cancer for individuals with Villagomez syndrome can be as high as 60% if there is no intervention (i.e. removal of polyps detected on colonoscopy).  Other risks include gastric, urinary tract, small bowel, biliary tract, pancreas, and brain.  Females with Villagomez syndrome also have an elevated risk for endometrial and ovarian cancer.  We also discussed the BRCA1/2 genes given the family history of breast cancer.      The standard approach to genetic testing is via a multigene panel.  Genes included on these panels have varying degrees of risk associated, and management and screening guidelines vary based on the specific gene.  Hereditary cancer syndromes can demonstrate incomplete penetrance and variable expression within families. There are genes that are evaluated that have been more recently described, and there may be less data regarding the risks and therefore may not have established management guidelines at this time. We discussed the possibility of results that are unexpected based on family history. Based on Ms. Tierney's family history and her desire to get more information regarding her personal risks and risks for her family, she opted to pursue testing through a panel evaluating several other genes known to increase the  risk for cancer.      GENETIC TESTING:  The risks, benefits and limitations of genetic testing and implications for clinical management following testing were reviewed. DNA test results can influence decisions regarding screening and prevention.  Genetic testing can have significant psychological implications for both individuals and families. Also discussed was the possibility of employment and insurance discrimination based on genetic test results and the federal and states laws that are in place to prevent this (VINNIE), as well as the limitations of these laws.         We discussed multigene panel testing, which would involve testing several genes associated with an increased cancer risk. The benefits and limitations of genetic testing were discussed and Ms. Tierney decided to pursue testing of the genes via the panel. The implications of a positive or negative test result were discussed.  We also discussed the importance of testing on an affected relative.  In cases where an affected relative is not available for testing or not willing to pursue testing, it is appropriate to offer testing to an unaffected individual. We discussed the possibility that, in some cases, genetic test results may be ambiguous due to the identification of a genetic variant of uncertain significance (VUS). These variants may or may not be associated with an increased cancer risk. With multigene panel testing, it is not uncommon for a VUS to be identified.  If a VUS is identified, testing family members is not recommended and screening recommendations are made based on the family history.  The laboratories that perform genetic testing work to reclassify the VUS and send out an amended report if and when a VUS is reclassified.  The majority of variant findings are ultimately reclassified to a negative result. Given her family history, a negative test result does not eliminate all cancer risk, although the risk would not be as high as it  would with positive genetic testing.     TEST RESULTS: Genetic testing was positive for a likely pathogenic variant (c.1176+1G>A) in the FLCN gene. This is associated with Uave-Daas-Oxyk syndrome (BHDS).  Genetic testing for this mutation is available for her family members. Ms. Tierney's siblings and children each have a 50% chance of also testing positive for this mutation.  Her mother should consider genetic testing to further determine from which side of the family this was inherited. After results disclosure, Ms. Tierney stated that her paternal uncle had multiple pneumothoraces (lung collapses) in his lifetime, which is a risk associated with BHDS; therefore, it is possible this was inherited from her paternal side of the family.     Until each at-risk family member has been proven not to carry this FLCN mutation, he or she should be offered increased surveillance. Relatives can call our office at [phone] for assistance in scheduling an appointment; however, a physician referral to our office will prompt our coordinator to contact patients to schedule an appointment. For family members who live elsewhere, there are genetic counselors at most [facility]. They can find a genetic counselor by visiting the National Society of Genetic Counselors website at www.nsgc.org or they can call our office and we would be happy to give them the contact information of the closest genetic counselor.  Ms. Tierney would need to provide relatives with a copy of her result report so appropriate testing could be ordered.     We discussed that BHDS is an autosomal dominant disorder caused by mutations in the FLCN gene. The clinical characteristics of BHDS include skin manifestations (fibrofolliculomas, trichodiscomas/angiofibromas, perifollicular fibromas, and acrochordons), pulmonary (lung) cysts, spontaneous pneumothorax, and various types of renal tumors.  BHDS can present very differently in individuals,  even within the same family.  Skin lesions typically appear during the third and fourth decades of life and tend to increase in size and number with age. Multiple lung cysts are seen in approximately 70-85% of individuals with BHDS and are commonly bilateral and multifocal; while most individuals are asymptomatic, they are at risk for spontaneous pneumothorax.  Approximately 25% of individuals with BHDS experience spontaneous pneumothorax. A spontaneous pneumothorax is a condition in which air accumulates between the lungs and chest cavity, and this can lead to a partial or complete collapse of the lungs.  Individuals with BHDS have an increased risk of renal (kidney) tumors that tend to be bilateral, multifocal, and are typically slow growing. Studies have shown that individuals with BHDS are at increased risk of renal cell tumors with varying malignancy potential, and the average age of tumor diagnosis is 48 years of age. The estimated lifetime risk for renal cell carcinoma is approximately 19-21%. Clinical practice guidelines (2024) note that there is currently not sufficient evidence of an increased risk for other tumors that have been observed in families with BHD syndrome (colorectal, melanoma, thyroid).     SCREENING AND MANAGEMENT: Clinical practice guidelines were published earlier this year (Valeriowijk et al., 2024) for surveillance and management of individuals with BHDS and screening recommendations are outlined below.     To establish the extent of disease in individuals with BHDS, the following evaluations are recommended:  Detailed dermatologic exam beginning at age 20.  High-resolution computed tomography (HRCT) or CT of the chest is highly recommended for visualization of pulmonary cysts & evidence of pneumothorax.  Individuals who have symptoms/signs of pneumothorax should immediately undergo chest x-ray and CT of the chest.  Baseline abdominal/pelvic MRI to screen for renal tumor(s) beginning at  age 20.     Individuals with BHDS should have regular monitoring by physicians familiar with the spectrum of BHDS.  In particular, surveillance for and monitoring of renal tumors include the following:  Yearly MRI of the kidneys is the optimal screening modality to asses for kidney lesions (abdominal/pelvic CT scan with contrast is an alternative when MRI is not an option). However, the long-term effects of cumulative radiation exposure in individuals with BHDS is unknown and has not been studied.   As a result of the low aggressiveness of kidney tumors and the 3.0-cm rule used by surgeons in treating renal tumors, affected individuals without a family history of kidney tumors who have had two to three consecutive annual MRI examinations without the detection of kidney lesions may be screened every two years until a suspicious lesion is identified. Of note, the use of renal ultrasound examination is helpful in further characterization of kidney lesions but should not be used as a primary screening modality.  Renal tumors <3.0 cm in diameter are monitored by periodic imaging. When the largest renal tumor reaches 3.0 cm in maximal diameter, evaluation by a urologic surgeon is appropriate with consideration of nephron-sparing surgery.  Rapidly growing lesions and/or symptoms including pain, blood in the urine, or atypical presentations require a more individualized approach.    It is recommended that individuals with BHD syndrome avoid cigarette smoking and high ambient pressures, which may precipitate spontaneous pneumothorax.      While it has been suggested that BHDS may predispose to a variety of other neoplasms, including colorectal, thyroid and salivary gland tumours, and melanoma, to date, none of these possible associations have been confirmed sufficiently to indicate that specific surveillance is required, per current practice guidelines (2024).     Of note, Ms. Tierney was also found to have a variant of  uncertain significance (VUS) in the LZTR1 gene. A VUS is a difference within a gene that may or may not impact the function of the gene.  VUSs are not clinically actionable findings, and therefore this result does not impact management in any way.  The majority of VUSs are ultimately reclassified to benign variants.  The identification of a VUS is common in multigene panel testing, given the number of genes being evaluated and the presence of genetic variation in the population.  If this variant is ever reclassified, a new report will be issued by the laboratory and released directly to the ordering physician, and our office.       PLAN:  Genetic counseling remains available to Ms. Tierney and her family. She plans to follow up with her primary care physician for coordination of screening. If she has any questions, she is encouraged to contact our office at [phone].         Joselin Jackson, MS, [facility] [place] [facility]  Licensed Certified Genetic Counselor      Cc: MD Kacie Feliciano, DO

## 2025-02-13 ENCOUNTER — TRANSCRIBE ORDERS (OUTPATIENT)
Dept: ADMINISTRATIVE | Facility: HOSPITAL | Age: 55
End: 2025-02-13
Payer: COMMERCIAL

## 2025-02-13 DIAGNOSIS — Z12.31 VISIT FOR SCREENING MAMMOGRAM: Primary | ICD-10-CM

## 2025-03-13 ENCOUNTER — DOCUMENTATION (OUTPATIENT)
Dept: GENETICS | Facility: HOSPITAL | Age: 55
End: 2025-03-13
Payer: COMMERCIAL

## 2025-03-13 LAB
NCCN CRITERIA FLAG: ABNORMAL
TYRER CUZICK SCORE: 10.7

## 2025-03-17 ENCOUNTER — HOSPITAL ENCOUNTER (OUTPATIENT)
Dept: MAMMOGRAPHY | Facility: HOSPITAL | Age: 55
Discharge: HOME | End: 2025-03-17
Admitting: OBSTETRICS & GYNECOLOGY
Payer: COMMERCIAL

## 2025-03-17 DIAGNOSIS — Z12.31 VISIT FOR SCREENING MAMMOGRAM: ICD-10-CM

## 2025-03-17 PROCEDURE — 77063 BREAST TOMOSYNTHESIS BI: CPT

## 2025-03-17 PROCEDURE — 77067 SCR MAMMO BI INCL CAD: CPT

## 2025-04-03 ENCOUNTER — HOSPITAL ENCOUNTER (OUTPATIENT)
Dept: MAMMOGRAPHY | Facility: HOSPITAL | Age: 55
Discharge: HOME | End: 2025-04-03
Payer: COMMERCIAL

## 2025-04-03 DIAGNOSIS — Z12.31 VISIT FOR SCREENING MAMMOGRAM: ICD-10-CM

## 2025-04-15 ENCOUNTER — HOSPITAL ENCOUNTER (OUTPATIENT)
Dept: MAMMOGRAPHY | Facility: HOSPITAL | Age: 55
Discharge: HOME | End: 2025-04-15
Admitting: RADIOLOGY
Payer: COMMERCIAL

## 2025-04-15 ENCOUNTER — TRANSCRIBE ORDERS (OUTPATIENT)
Dept: ADMINISTRATIVE | Facility: HOSPITAL | Age: 55
End: 2025-04-15
Payer: COMMERCIAL

## 2025-04-15 DIAGNOSIS — R92.8 ABNORMAL MAMMOGRAM: ICD-10-CM

## 2025-04-15 DIAGNOSIS — R92.8 ABNORMAL MAMMOGRAM: Primary | ICD-10-CM

## 2025-04-15 PROCEDURE — 77061 BREAST TOMOSYNTHESIS UNI: CPT | Performed by: RADIOLOGY

## 2025-04-15 PROCEDURE — 77065 DX MAMMO INCL CAD UNI: CPT | Performed by: RADIOLOGY

## 2025-04-15 PROCEDURE — G0279 TOMOSYNTHESIS, MAMMO: HCPCS

## 2025-04-15 PROCEDURE — 77065 DX MAMMO INCL CAD UNI: CPT

## 2025-05-15 ENCOUNTER — HOSPITAL ENCOUNTER (OUTPATIENT)
Dept: MAMMOGRAPHY | Facility: HOSPITAL | Age: 55
Discharge: HOME | End: 2025-05-15
Payer: COMMERCIAL

## 2025-05-15 DIAGNOSIS — R92.8 ABNORMAL MAMMOGRAM: ICD-10-CM

## 2025-05-15 PROCEDURE — 25010000002 LIDOCAINE 1 % SOLUTION: Performed by: RADIOLOGY

## 2025-05-15 PROCEDURE — C1819 TISSUE LOCALIZATION-EXCISION: HCPCS

## 2025-05-15 PROCEDURE — 25010000002 LIDOCAINE 1% - EPINEPHRINE 1:100000 1 %-1:100000 SOLUTION: Performed by: RADIOLOGY

## 2025-05-15 PROCEDURE — 88305 TISSUE EXAM BY PATHOLOGIST: CPT | Performed by: OBSTETRICS & GYNECOLOGY

## 2025-05-15 RX ORDER — LIDOCAINE HYDROCHLORIDE AND EPINEPHRINE 10; 10 MG/ML; UG/ML
10 INJECTION, SOLUTION INFILTRATION; PERINEURAL ONCE
Status: COMPLETED | OUTPATIENT
Start: 2025-05-15 | End: 2025-05-15

## 2025-05-15 RX ORDER — LIDOCAINE HYDROCHLORIDE 10 MG/ML
13 INJECTION, SOLUTION INFILTRATION; PERINEURAL ONCE
Status: COMPLETED | OUTPATIENT
Start: 2025-05-15 | End: 2025-05-15

## 2025-05-15 RX ADMIN — LIDOCAINE HYDROCHLORIDE 7 ML: 10 INJECTION, SOLUTION INFILTRATION; PERINEURAL at 11:08

## 2025-05-15 RX ADMIN — LIDOCAINE HYDROCHLORIDE,EPINEPHRINE BITARTRATE 10 ML: 10; .01 INJECTION, SOLUTION INFILTRATION; PERINEURAL at 11:11

## 2025-05-15 NOTE — PROGRESS NOTES
Alert and oriented. Denies discomfort, no active bleeding, steri-strips not visualized, gauze dressing intact. Cold pack given. Questions answered, support given. Verbalizes and demonstrates understanding of post-care instructions, written copy given.        Patient Requesting:  PA for medication HYDROcodone-acetaminophen (NORCO) 5-325 MG per tablet due to insurance purposes

## 2025-05-16 ENCOUNTER — TELEPHONE (OUTPATIENT)
Dept: MAMMOGRAPHY | Facility: HOSPITAL | Age: 55
End: 2025-05-16
Payer: COMMERCIAL

## 2025-05-16 LAB
CYTO UR: NORMAL
LAB AP CASE REPORT: NORMAL
LAB AP CLINICAL INFORMATION: NORMAL
PATH REPORT.FINAL DX SPEC: NORMAL
PATH REPORT.GROSS SPEC: NORMAL

## 2025-07-01 DIAGNOSIS — N91.2 AMENORRHEA: ICD-10-CM

## 2025-07-01 DIAGNOSIS — Z80.49 FAMILY HISTORY OF UTERINE CANCER: Primary | ICD-10-CM

## 2025-07-03 ENCOUNTER — OFFICE VISIT (OUTPATIENT)
Dept: OBSTETRICS AND GYNECOLOGY | Facility: CLINIC | Age: 55
End: 2025-07-03
Payer: COMMERCIAL

## 2025-07-03 VITALS
SYSTOLIC BLOOD PRESSURE: 132 MMHG | DIASTOLIC BLOOD PRESSURE: 74 MMHG | WEIGHT: 274 LBS | HEIGHT: 67 IN | BODY MASS INDEX: 43 KG/M2

## 2025-07-03 DIAGNOSIS — Z80.3 FAMILY HISTORY OF BREAST CANCER: ICD-10-CM

## 2025-07-03 DIAGNOSIS — Q87.89 BIRT-HOGG-DUBE SYNDROME: ICD-10-CM

## 2025-07-03 DIAGNOSIS — Z80.49 FAMILY HISTORY OF UTERINE CANCER: ICD-10-CM

## 2025-07-03 DIAGNOSIS — Z12.31 BREAST CANCER SCREENING BY MAMMOGRAM: ICD-10-CM

## 2025-07-03 DIAGNOSIS — Z01.419 WOMEN'S ANNUAL ROUTINE GYNECOLOGICAL EXAMINATION: Primary | ICD-10-CM

## 2025-07-03 RX ORDER — ATORVASTATIN CALCIUM 40 MG/1
40 TABLET, FILM COATED ORAL DAILY
COMMUNITY
Start: 2025-05-27

## 2025-07-03 RX ORDER — BUPROPION HYDROCHLORIDE 150 MG/1
150 TABLET ORAL DAILY
COMMUNITY
Start: 2025-06-13

## 2025-07-03 NOTE — PROGRESS NOTES
Gynecologic Annual Exam Note        CC - Here for Annual Exam.     Subjective     HPI  Daisy Tierney is a 54 y.o. female, , who presents for annual well woman exam.  She is postmenopausal.  Patient reports problems with: none.  Partner Status: Marital Status: .  New Partners since last visit: no.   She Reports vasomotor symptoms.  She denies  issues with urinary incontinence.     The patient does not have any complaints today.    She has concerns about domestic violence: no.        Additional OB/GYN History     History of abnormal Pap smear: no  Family history of uterine, colon, breast, or ovarian cancer: yes - mother breast & uterine cancer, M Aunt x2 uterine cancer  Performs monthly Self-Breast Exam: yes  Exercises Regularly: no  Feelings of Anxiety or Depression: no    Last Pap :   Last Completed Pap Smear            Upcoming       PAP SMEAR (Every 3 Years) Next due on 2024  LIQUID-BASED PAP SMEAR WITH HPV GENOTYPING REGARDLESS OF INTERPRETATION (CLAUDIA,COR,MAD)    2021  SCANNED - PAP SMEAR    2020  Done - negative; last HPV regardless 10/11/2018                          Last mammogram: L Breast Biopsy 5/15/25; negative. Fibrocystic and calcification   Last Completed Mammogram            Awaiting Completion       MAMMOGRAM (Every 2 Years) Order placed this encounter      2025  Order placed for Mammo Screening Digital Tomosynthesis Bilateral With CAD by Kendra Pinto MD    04/15/2025  Mammo Diagnostic Digital Tomosynthesis Left With CAD    2025  Mammo Screening Technical Recall Left    2025  Mammo Screening Digital Tomosynthesis Bilateral With CAD    2024  Mammo Screening Digital Tomosynthesis Bilateral With CAD     Only the first 5 history entries have been loaded, but more history exists.                        Last colonoscopy:    Last Completed Colonoscopy            Needs Review       COLORECTAL CANCER SCREENING (COLONOSCOPY -  Every 10 Years) Tentatively due on 2023  Outside Procedure: COLONOSCOPY    2008  COLONOSCOPY (Done - performed with Dr. Estrada; irritable bowel syndrome)                          Last DEXA: 2025, normal     Tobacco Usage?: No   OB History          4    Para   2    Term   2       0    AB   2    Living   2         SAB   2    IAB   0    Ectopic   0    Molar   0    Multiple   0    Live Births   2                Health Maintenance   Topic Date Due    DIABETIC FOOT EXAM  Never done    URINE MICROALBUMIN-CREATININE RATIO (uACR)  Never done    Hepatitis B (1 of 3 - 19+ 3-dose series) Never done    ANNUAL PHYSICAL  Never done    COVID-19 Vaccine ( season) 2025    Annual Gynecologic Pelvic and Breast Exam  2025    INFLUENZA VACCINE  2025    DIABETIC EYE EXAM  10/15/2025    HEMOGLOBIN A1C  2025    MAMMOGRAM  04/15/2027    PAP SMEAR  2027    TDAP/TD VACCINES (3 - Td or Tdap) 2032    COLORECTAL CANCER SCREENING  2033    HEPATITIS C SCREENING  Completed    Pneumococcal Vaccine 50+  Completed    ZOSTER VACCINE  Completed       The additional following portions of the patient's history were reviewed and updated as appropriate: allergies, current medications, past family history, past medical history, past social history, past surgical history, and problem list.    Past Medical History:   Diagnosis Date    Allergic rhinitis     Asthma 2022    Diabetes mellitus 2023    GERD (gastroesophageal reflux disease)     Gestational diabetes 2005    History of gestational diabetes     History of pregnancy induced hypertension     History of  labor     Hypertension     Irritable bowel syndrome         Past Surgical History:   Procedure Laterality Date    NO PAST SURGERIES      WISDOM TOOTH EXTRACTION          Review of Systems   Constitutional: Negative.    HENT: Negative.     Eyes: Negative.    Respiratory:  "Negative.     Cardiovascular: Negative.    Gastrointestinal: Negative.    Endocrine: Negative.    Genitourinary: Negative.    Musculoskeletal: Negative.    Skin: Negative.    Allergic/Immunologic: Negative.    Neurological: Negative.    Hematological: Negative.    Psychiatric/Behavioral: Negative.         I have reviewed and agree with the HPI, ROS, and historical information as entered above. Kendra Pinto MD    Objective   /74 (BP Location: Right arm)   Ht 170.2 cm (67\")   Wt 124 kg (274 lb)   LMP  (LMP Unknown) Comment: haven't had a period since last year  BMI 42.91 kg/m²     Physical Exam  Vitals and nursing note reviewed. Exam conducted with a chaperone present.   Constitutional:       General: She is awake.   HENT:      Head: Normocephalic and atraumatic.   Neck:      Thyroid: No thyroid mass, thyromegaly or thyroid tenderness.   Cardiovascular:      Rate and Rhythm: Normal rate.      Heart sounds: No murmur heard.     No friction rub. No gallop.   Pulmonary:      Effort: Pulmonary effort is normal.      Breath sounds: Normal breath sounds. No stridor. No wheezing, rhonchi or rales.   Chest:      Chest wall: No mass or tenderness.   Breasts:     Right: Normal. No bleeding, inverted nipple, mass, nipple discharge, skin change or tenderness.      Left: Normal. No bleeding, inverted nipple, mass, nipple discharge, skin change or tenderness.   Abdominal:      Palpations: Abdomen is soft.      Tenderness: There is no guarding or rebound.      Hernia: There is no hernia in the left inguinal area or right inguinal area.   Genitourinary:     General: Normal vulva.      Pubic Area: No rash.       Labia:         Right: No rash, tenderness, lesion or injury.         Left: No rash, tenderness, lesion or injury.       Urethra: No prolapse, urethral swelling or urethral lesion.      Vagina: No foreign body. No vaginal discharge, erythema, tenderness, bleeding or lesions.      Cervix: No cervical motion " tenderness, discharge, friability, lesion, erythema or cervical bleeding.      Uterus: Normal. Not deviated, not enlarged, not fixed and not tender.       Adnexa: Right adnexa normal and left adnexa normal.        Right: No mass, tenderness or fullness.          Left: No mass, tenderness or fullness.        Rectum: No external hemorrhoid.   Musculoskeletal:      Cervical back: Normal range of motion.   Lymphadenopathy:      Upper Body:      Right upper body: No supraclavicular or axillary adenopathy.      Left upper body: No supraclavicular or axillary adenopathy.   Skin:     General: Skin is warm.   Neurological:      Mental Status: She is alert and oriented to person, place, and time.   Psychiatric:         Mood and Affect: Mood and affect normal.         Speech: Speech normal.         Assessment & Plan     Assessment     Problem List Items Addressed This Visit       Mqgn-Iter-Neil syndrome    Family history of breast cancer          Overview    Mother diagnosed at age 67    Pt's Jackie Risk 18%    Getting annual mammogram and Breast MRI        Family history of uterine cancer    Overview   Mother diagnosed in her 70's    Maternal Aunt x 2---one in her 50's, one in her 70's           Relevant Orders    US Non-ob Transvaginal     Other Visit Diagnoses         Women's annual routine gynecological examination    -  Primary      Breast cancer screening by mammogram        Relevant Orders    Mammo Screening Digital Tomosynthesis Bilateral With CAD            Plan     Reviewed monthly self breast exams.  Instructed to call with lumps, pain, or breast discharge.  Yearly mammograms ordered.  Ordered mammogram today.  Recommended use of Vitamin D and getting adequate calcium in her diet. (1500mg)  Reviewed exercise as a preventative health measures.   RTC in 1 year or PRN with problems.  Return in about 1 year (around 7/3/2026) for Annual physical with u/s.      Kendra Pinto MD  07/03/2025